# Patient Record
Sex: MALE | Race: WHITE | NOT HISPANIC OR LATINO | URBAN - METROPOLITAN AREA
[De-identification: names, ages, dates, MRNs, and addresses within clinical notes are randomized per-mention and may not be internally consistent; named-entity substitution may affect disease eponyms.]

---

## 2019-08-05 ENCOUNTER — HOSPITAL ENCOUNTER (OUTPATIENT)
Dept: URGENT CARE | Facility: CLINIC | Age: 44
Discharge: HOME | End: 2019-08-05

## 2019-08-07 LAB — B BURGDOR IGG+IGM SER-ACNC: <0.91 ISR (ref 0–0.9)

## 2019-08-08 LAB
R RICKETTSI IGG SER QL IA: NEGATIVE
R RICKETTSI IGM TITR SER: 1.96 INDEX (ref 0–0.89)

## 2019-08-09 LAB
E CHAFFEENSIS IGG TITR SER IF: NEGATIVE {TITER}
E. CHAFFEENSIS (HME) IGM TITER: NEGATIVE

## 2022-05-31 DIAGNOSIS — G47.00 INSOMNIA: Primary | ICD-10-CM

## 2022-09-20 ENCOUNTER — VIRTUAL VISIT (OUTPATIENT)
Dept: SLEEP MEDICINE | Facility: CLINIC | Age: 47
End: 2022-09-20
Payer: COMMERCIAL

## 2022-09-20 VITALS — BODY MASS INDEX: 31.39 KG/M2 | WEIGHT: 200 LBS | HEIGHT: 67 IN

## 2022-09-20 DIAGNOSIS — G89.4 CHRONIC PAIN SYNDROME: ICD-10-CM

## 2022-09-20 DIAGNOSIS — G47.10 HYPERSOMNIA: ICD-10-CM

## 2022-09-20 DIAGNOSIS — G47.09 OTHER INSOMNIA: Primary | ICD-10-CM

## 2022-09-20 PROCEDURE — 99204 OFFICE O/P NEW MOD 45 MIN: CPT | Mod: 95 | Performed by: INTERNAL MEDICINE

## 2022-09-20 RX ORDER — BUPRENORPHINE HYDROCHLORIDE, NALOXONE HYDROCHLORIDE 8; 2 MG/1; MG/1
FILM, SOLUBLE BUCCAL; SUBLINGUAL
COMMUNITY
Start: 2022-09-13

## 2022-09-20 RX ORDER — TESTOSTERONE CYPIONATE 200 MG/ML
50 INJECTION, SOLUTION INTRAMUSCULAR
COMMUNITY

## 2022-09-20 ASSESSMENT — SLEEP AND FATIGUE QUESTIONNAIRES
HOW LIKELY ARE YOU TO NOD OFF OR FALL ASLEEP WHILE LYING DOWN TO REST IN THE AFTERNOON WHEN CIRCUMSTANCES PERMIT: HIGH CHANCE OF DOZING
HOW LIKELY ARE YOU TO NOD OFF OR FALL ASLEEP WHEN YOU ARE A PASSENGER IN A CAR FOR AN HOUR WITHOUT A BREAK: HIGH CHANCE OF DOZING
HOW LIKELY ARE YOU TO NOD OFF OR FALL ASLEEP IN A CAR, WHILE STOPPED FOR A FEW MINUTES IN TRAFFIC: WOULD NEVER DOZE
HOW LIKELY ARE YOU TO NOD OFF OR FALL ASLEEP WHILE SITTING QUIETLY AFTER LUNCH WITHOUT ALCOHOL: MODERATE CHANCE OF DOZING
HOW LIKELY ARE YOU TO NOD OFF OR FALL ASLEEP WHILE SITTING AND READING: HIGH CHANCE OF DOZING
HOW LIKELY ARE YOU TO NOD OFF OR FALL ASLEEP WHILE SITTING INACTIVE IN A PUBLIC PLACE: MODERATE CHANCE OF DOZING
HOW LIKELY ARE YOU TO NOD OFF OR FALL ASLEEP WHILE SITTING AND TALKING TO SOMEONE: WOULD NEVER DOZE
HOW LIKELY ARE YOU TO NOD OFF OR FALL ASLEEP WHILE WATCHING TV: HIGH CHANCE OF DOZING

## 2022-09-20 NOTE — PROGRESS NOTES
Adrian is a 46 year old who is being evaluated via a billable video visit.      How would you like to obtain your AVS? Mail a copy  If the video visit is dropped, the invitation should be resent by: Send to e-mail at: [email or web address]  Will anyone else be joining your video visit? No      Erin Villafana    Video-Visit Details    Video Start Time: 10:05 AM    Type of service:  Video Visit    Video End Time:10:41 AM    Originating Location (pt. Location): Home    Distant Location (provider location):  [facility]     Platform used for Video Visit: [id number]       Chief complaint: Consultation requested by Nabila Dobson NP for evaluation of insomnia    History of Present Illness: 46-year-old gentleman with history of chronic back pain, sober from heroin abuse, currently on Suboxone.  He has difficulty getting enough sleep at night.  He reports he typically will sleep 4 to 5 hours and then wake up with severe back pain.  He will need to get up and sit in a firm chair for a while and perhaps walk for a while.  Sometimes he will be able to sleep again.  He works second shift as a supervisor.  He gets up every couple of hours at work to review the workstations and then will go back to the computer.  Sometimes on the computer he will have some difficulty maintaining alertness.  He is not currently driving.  He does bike everywhere.  No problems while biking.  He occasionally has some fears about falling asleep due to the pain.  He does have some difficulty getting to sleep on time sometimes related to pain, schedule and fear.  He is not taking any naps.  He does drink 2 [employer] drinks a day.  This is down from up to 6.  He also takes Suboxone twice a day.  He has occasional snoring no history of observed apneas.  No sleepwalking, sleep talking or dream enactment behavior.  He is recently obtained a dog after his mom's passing.  The dog gets him up earlier than he would normally  get up in order to get a walk.  He thinks he is typically getting up for the day between 8 and 9 AM.    He has been seen in the pain clinic for his back.  He has had evaluation including MRI and x-ray of hip.  He has been referred to rheumatology for evaluation of possible autoimmune etiology of back pain.    Lula Sleepiness Scale   Sitting and reading: High chance of dozing   Watching TV: High chance of dozing   Sitting, inactive in a public place (e.g. a theatre or a meeting): Moderate chance of dozing   As a passenger in a car for an hour without a break: High chance of dozing   Lying down to rest in the afternoon when circumstances permit: High chance of dozing   Sitting and talking to someone: Would never doze   Sitting quietly after a lunch without alcohol: Moderate chance of dozing   In a car, while stopped for a few minutes in traffic: Would never doze   Total score - Lula: 16   (Less than 10 normal)    Insomnia Severity Scale  CRISTINO Total Score: 19  Total score categories:  0-7 = No clinically significant insomnia   8-14 = Subthreshold insomnia   15-21 = Clinical insomnia (moderate severity)  22-28 = Clinical insomnia (severe)    STOP-BANG  Loud Snore   0  Excessively Tired/Sleepy   1  Observed apnea   0  Hypertension   0  BMI> 35 kg/m2   0  Age >50   0  Neck >16 in/40cm   ?  Male Gender   1  Total =   2  (0-2 low, 3-4 intermediate, 5-8 high risk of RUTH)      Past Medical History:   Diagnosis Date     Acute pancreatitis 1999     Chronic pain      Dorsalgia      Heroin abuse (H)      Low testosterone in male      Opioid abuse (H)        No Known Allergies    Current Outpatient Medications   Medication     SUBOXONE 8-2 MG per film     testosterone cypionate (DEPOTESTOSTERONE) 200 MG/ML injection     No current facility-administered medications for this visit.       Social History     Socioeconomic History     Marital status: Single     Spouse name: Not on file     Number of children: Not on file     Years  "of education: Not on file     Highest education level: Not on file   Occupational History     Not on file   Tobacco Use     Smoking status: Current Every Day Smoker     Smokeless tobacco: Never Used   Vaping Use     Vaping Use: Some days   Substance and Sexual Activity     Alcohol use: Not on file     Drug use: Not on file     Sexual activity: Not on file   Other Topics Concern     Not on file   Social History Narrative     Not on file     Social Determinants of Health     Financial Resource Strain: Not on file   Food Insecurity: Not on file   Transportation Needs: Not on file   Physical Activity: Not on file   Stress: Not on file   Social Connections: Not on file   Intimate Partner Violence: Not on file   Housing Stability: Not on file       Family History   Problem Relation Age of Onset     Lung Cancer Maternal Grandfather            EXAM:  Ht 1.702 m (5' 7\")   Wt 90.7 kg (200 lb)   BMI 31.32 kg/m    GENERAL: Alert and no distress  Full beard  EYES: Eyes grossly normal to inspection.  No discharge or erythema, or obvious scleral/conjunctival abnormalities.  RESP: No audible wheeze, cough, or visible cyanosis.  No visible retractions or increased work of breathing.    SKIN: Visible skin clear. No significant rash, abnormal pigmentation or lesions.  NEURO: Cranial nerves grossly intact.  Mentation and speech appropriate for age.  PSYCH: Mentation appears normal, affect normal, judgement and insight intact, normal speech and appearance well-groomed.     No results found for: TSH      ASSESSMENT:  46-year-old gentleman with history of chronic pain, heroin abuse currently on Suboxone therapy, second shift, excessive daytime sleepiness and mild snoring.  Overall he is at low to intermediate risk for obstructive sleep apnea.  He there have also been case reports of central sleep apnea associated with Suboxone.  I suspect the main underlying issue is pain and some potential contribution of shiftwork to his difficulty " with sleep.    PLAN:  Recommended formal cognitive behavioral therapy for insomnia to help to optimize his ability to fall asleep and stay asleep in the setting of shiftwork and chronic pain.  Also recommended home sleep apnea testing to evaluate for possible sleep disordered breathing.  We will order a mail out Watch Pat One test.  I will be contacting patient when those results are available.      50 minutes spent on the date of the encounter doing chart review, history and exam, documentation and further activities per the note    Jennifer Mayberry M.D.  Pulmonary/Critical Care/Sleep Medicine    [facility]   Fl[address], [address]   [address] [place], [place] [address]. [place] [address]   Appointments: [phone]    The above note was dictated using voice recognition software and may include typographical errors. Please contact the author for any clarifications.

## 2022-09-20 NOTE — PATIENT INSTRUCTIONS
"      MY TREATMENT INFORMATION FOR SLEEP APNEA-  Adrian Yin    DOCTOR : Jennifer Mayberry MD    Am I having a sleep study at a sleep center?  --->Due to normal delays, you will be contacted within 2-4 weeks to schedule    Am I having a home sleep study?  --->Watch the video for the device you are using:    -/drop off device-   https://www.[employer]achStreet.com/watch?v=yGGFBdELGhk    -Disposable device sent out require phone/computer application-   https://www.[employer]achStreet.com/watch?v=BCce_vbiwxE      Frequently asked questions:  1. What is Obstructive Sleep Apnea (RUTH)? RUTH is the most common type of sleep apnea. Apnea means, \"without breath.\"  Apnea is most often caused by narrowing or collapse of the upper airway as muscles relax during sleep.   Almost everyone has occasional apneas. Most people with sleep apnea have had brief interruptions at night frequently for many years.  The severity of sleep apnea is related to how frequent and severe the events are.   2. What are the consequences of RUTH? Symptoms include: feeling sleepy during the day, snoring loudly, gasping or stopping of breathing, trouble sleeping, and occasionally morning headaches or heartburn at night.  Sleepiness can be serious and even increase the risk of falling asleep while driving. Other health consequences may include development of high blood pressure and other cardiovascular disease in persons who are susceptible. Untreated RUTH  can contribute to heart disease, stroke and diabetes.   3. What are the treatment options? In most situations, sleep apnea is a lifelong disease that must be managed with daily therapy. Medications are not effective for sleep apnea and surgery is generally not considered until other therapies have been tried. Your treatment is your choice . Continuous Positive Airway (CPAP) works right away and is the therapy that is effective in nearly everyone. An oral device to hold your jaw forward is usually the next most " reliable option. Other options include postioning devices (to keep you off your back), weight loss, and surgery including a tongue pacing device. There is more detail about some of these options below.  4. Are my sleep studies covered by insurance? Although we will request verification of coverage, we advise you also check in advance of the study to ensure there is coverage.    Important tips for those choosing CPAP and similar devices   Know your equipment:  CPAP is continuous positive airway pressure that prevents obstructive sleep apnea by keeping the throat from collapsing while you are sleeping. In most cases, the device is  smart  and can slowly self-adjusts if your throat collapses and keeps a record every day of how well you are treated-this information is available to you and your care team.  BPAP is bilevel positive airway pressure that keeps your throat open and also assists each breath with a pressure boost to maintain adequate breathing.  Special kinds of BPAP are used in patients who have inadequate breathing from lung or heart disease. In most cases, the device is  smart  and can slowly self-adjusts to assist breathing. Like CPAP, the device keeps a record of how well you are treated.  Your mask is your connection to the device. You get to choose what feels most comfortable and the staff will help to make sure if fits. Here: are some examples of the different masks that are available:       Key points to remember on your journey with sleep apnea:  Sleep study.  PAP devices often need to be adjusted during a sleep study to show that they are effective and adjusted right.  Good tips to remember: Try wearing just the mask during a quiet time during the day so your body adapts to wearing it. A humidifier is recommended for comfort in most cases to prevent drying of your nose and throat. Allergy medication from your provider may help you if you are having nasal congestion.  Getting settled-in. It takes  more than one night for most of us to get used to wearing a mask. Try wearing just the mask during a quiet time during the day so your body adapts to wearing it. A humidifier is recommended for comfort in most cases. Our team will work with you carefully on the first day and will be in contact within 4 days and again at 2 and 4 weeks for advice and remote device adjustments. Your therapy is evaluated by the device each day.   Use it every night. The more you are able to sleep naturally for 7-8 hours, the more likely you will have good sleep and to prevent health risks or symptoms from sleep apnea. Even if you use it 4 hours it helps. Occasionally all of us are unable to use a medical therapy, in sleep apnea, it is not dangerous to miss one night.   Communicate. Call our skilled team on the number provided on the first day if your visit for problems that make it difficult to wear the device. Over 2 out of 3 patients can learn to wear the device long-term with help from our team. Remember to call our team or your sleep providers if you are unable to wear the device as we may have other solutions for those who cannot adapt to mask CPAP therapy. It is recommended that you sleep your sleep provider within the first 3 months and yearly after that if you are not having problems.   Use it for your health. We encourage use of CPAP masks during daytime quiet periods to allow your face and brain to adapt to the sensation of CPAP so that it will be a more natural sensation to awaken to at night or during naps. This can be very useful during the first few weeks or months of adapting to CPAP though it does not help medically to wear CPAP during wakefulness and  should not be used as a strategy just to meet guidelines.  Take care of your equipment. Make sure you clean your mask and tubing using directions every day and that your filter and mask are replaced as recommended or if they are not working.     BESIDES CPAP, WHAT OTHER  THERAPIES ARE THERE?    Positioning Device  Positioning devices are generally used when sleep apnea is mild and only occurs on your back.This example shows a pillow that straps around the waist. It may be appropriate for those whose sleep study shows milder sleep apnea that occurs primarily when lying flat on one's back. Preliminary studies have shown benefit but effectiveness at home may need to be verified by a home sleep test. These devices are generally not covered by medical insurance.  Examples of devices that maintain sleeping on the back to prevent snoring and mild sleep apnea.    Belt type body positioner  http://[email or web address]    Electronic reminder  http://nightshifttherapy.com/            Oral Appliance  What is oral appliance therapy?  An oral appliance device fits on your teeth at night like a retainer used after having braces. The device is made by a specialized dentist and requires several visits over 1-2 months before a manufactured device is made to fit your teeth and is adjusted to prevent your sleep apnea. Once an oral device is working properly, snoring should be improved. A home sleep test may be recommended at that time if to determine whether the sleep apnea is adequately treated.       Some things to remember:  -Oral devices are often, but not always, covered by your medical insurance. Be sure to check with your insurance provider.   -If you are referred for oral therapy, you will be given a list of specialized dentists to consider or you may choose to visit the Web site of the American Academy of Dental Sleep Medicine  -Oral devices are less likely to work if you have severe sleep apnea or are extremely overweight.     More detailed information  An oral appliance is a small acrylic device that fits over the upper and lower teeth  (similar to a retainer or a mouth guard). This device slightly moves jaw forward, which moves the base of the tongue forward, opens the airway, improves breathing  for effective treat snoring and obstructive sleep apnea in perhaps 7 out of 10 people .  The best working devices are custom-made by a dental device  after a mold is made of the teeth 1, 2, 3.  When is an oral appliance indicated?  Oral appliance therapy is recommended as a first-line treatment for patients with primary snoring, mild sleep apnea, and for patients with moderate sleep apnea who prefer appliance therapy to use of CPAP4, 5. Severity of sleep apnea is determined by sleep testing and is based on the number of respiratory events per hour of sleep.   How successful is oral appliance therapy?  The success rate of oral appliance therapy in patients with mild sleep apnea is 75-80% while in patients with moderate sleep apnea it is 50-70%. The chance of success in patients with severe sleep apnea is 40-50%. The research also shows that oral appliances have a beneficial effect on the cardiovascular health of RUTH patients at the same magnitude as CPAP therapy7.  Oral appliances should be a second-line treatment in cases of severe sleep apnea, but if not completely successful then a combination therapy utilizing CPAP plus oral appliance therapy may be effective. Oral appliances tend to be effective in a broad range of patients although studies show that the patients who have the highest success are females, younger patients, those with milder disease, and less severe obesity. 3, 6.   Finding a dentist that practices dental sleep medicine  Specific training is available through the American Academy of Dental Sleep Medicine for dentists interested in working in the field of sleep. To find a dentist who is educated in the field of sleep and the use of oral appliances, near you, visit the Web site of the American Academy of Dental Sleep Medicine.    References  1. Lise et al. Objectively measured vs self-reported compliance during oral appliance therapy for sleep-disordered breathing. Chest 2013;  144(5): 8458-0516.  2. Bowen et al. Objective measurement of compliance during oral appliance therapy for sleep-disordered breathing. Thorax 2013; 68(1): 91-96.  3. Surekha et al. Mandibular advancement devices in 620 men and women with RUTH and snoring: tolerability and predictors of treatment success. Chest 2004; 125: 0878-0092.  4. Margie, et al. Oral appliances for snoring and RUTH: a review. Sleep 2006; 29: 244-262.  5. Frank et al. Oral appliance treatment for RUTH: an update. J Clin Sleep Med 2014; 10(2): 215-227.  6. Tim et al. Predictors of OSAH treatment outcome. J Dent Res 2007; 86: 7416-6994.      Weight Loss:    Weight loss is a long-term strategy that may improve sleep apnea in some patients.    Weight management is a personal decision and the decision should be based on your interest and the potential benefits.  If you are interested in exploring weight loss strategies, the following discussion covers the impact on weight loss on sleep apnea and the approaches that may be successful.    Being overweight does not necessarily mean you will have health consequences.  Those who have BMI over 35 or over 27 with existing medical conditions carries greater risk.   Weight loss decreases severity of sleep apnea in most people with obesity. For those with mild obesity who have developed snoring with weight gain, even 15-30 pound weight loss can improve and occasionally eliminate sleep apnea.  Structured and life-long dietary and health habits are necessary to lose weight and keep healthier weight levels.     Though there may be significant health benefits from weight loss, long-term weight loss is very difficult to achieve- studies show success with dietary management in less than 10% of people. In addition, substantial weight loss may require years of dietary control and may be difficult if patients have severe obesity. In these cases, surgical management may be considered.  Finally, older  individuals who have tolerated obesity without health complications may be less likely to benefit from weight loss strategies.      [unfilled]    Surgery:    Surgery for obstructive sleep apnea is considered generally only when other therapies fail to work. Surgery may be discussed with you if you are having a difficult time tolerating CPAP and or when there is an abnormal structure that requires surgical correction.  Nose and throat surgeries often enlarge the airway to prevent collapse.  Most of these surgeries create pain for 1-2 weeks and up to half of the most common surgeries are not effective throughout life.  You should carefully discuss the benefits and drawbacks to surgery with your sleep provider and surgeon to determine if it is the best solution for you.   More information  Surgery for RUTH is directed at areas that are responsible for narrowing or complete obstruction of the airway during sleep.  There are a wide range of procedures available to enlarge and/or stabilize the airway to prevent blockage of breathing in the three major areas where it can occur: the palate, tongue, and nasal regions.  Successful surgical treatment depends on the accurate identification of the factors responsible for obstructive sleep apnea in each person.  A personalized approach is required because there is no single treatment that works well for everyone.  Because of anatomic variation, consultation with an examination by a sleep surgeon is a critical first step in determining what surgical options are best for each patient.  In some cases, examination during sedation may be recommended in order to guide the selection of procedures.  Patients will be counseled about risks and benefits as well as the typical recovery course after surgery. Surgery is typically not a cure for a person s RUTH.  However, surgery will often significantly improve one s RUTH severity (termed  success rate ).  Even in the absence of a cure, surgery  will decrease the cardiovascular risk associated with OSA7; improve overall quality of life8 (sleepiness, functionality, sleep quality, etc).      Palate Procedures:  Patients with RUTH often have narrowing of their airway in the region of their tonsils and uvula.  The goals of palate procedures are to widen the airway in this region as well as to help the tissues resist collapse.  Modern palate procedure techniques focus on tissue conservation and soft tissue rearrangement, rather than tissue removal.  Often the uvula is preserved in this procedure. Residual sleep apnea is common in patient after pharyngoplasty with an average reduction in sleep apnea events of 33%2.      Tongue Procedures:  ExamWhile patients are awake, the muscles that surround the throat are active and keep this region open for breathing. These muscles relax during sleep, allowing the tongue and other structures to collapse and block breathing.  There are several different tongue procedures available.  Selection of a tongue base procedure depends on characteristics seen on physical exam.  Generally, procedures are aimed at removing bulky tissues in this area or preventing the back of the tongue from falling back during sleep.  Success rates for tongue surgery range from 50-62%3.    Hypoglossal Nerve Stimulation:  Hypoglossal nerve stimulation has recently received approval from the [place] Food and Drug Administration for the treatment of obstructive sleep apnea.  This is based on research showing that the system was safe and effective in treating sleep apnea6.  Results showed that the median AHI score decreased 68%, from 29.3 to 9.0. This therapy uses an implant system that senses breathing patterns and delivers mild stimulation to airway muscles, which keeps the airway open during sleep.  The system consists of three fully implanted components: a small generator (similar in size to a pacemaker), a breathing sensor, and a stimulation lead.   Using a small handheld remote, a patient turns the therapy on before bed and off upon awakening.    Candidates for this device must be greater than 22 years of age, have moderate to severe RUTH (AHI between 20-65), BMI less than 32, have tried CPAP/oral appliance without success, and have appropriate upper airway anatomy (determined by a sleep endoscopy performed by Dr. Lee).    Hypoglossal Nerve Stimulation Pathway:    The sleep surgeon s office will work with the patient through the insurance prior-authorization process (including communications and appeals).    Nasal Procedures:  Nasal obstruction can interfere with nasal breathing during the day and night.  Studies have shown that relief of nasal obstruction can improve the ability of some patients to tolerate positive airway pressure therapy for obstructive sleep apnea1.  Treatment options include medications such as nasal saline, topical corticosteroid and antihistamine sprays, and oral medications such as antihistamines or decongestants. Non-surgical treatments can include external nasal dilators for selected patients. If these are not successful by themselves, surgery can improve the nasal airway either alone or in combination with these other options.      Combination Procedures:  Combination of surgical procedures and other treatments may be recommended, particularly if patients have more than one area of narrowing or persistent positional disease.  The success rate of combination surgery ranges from 66-80%2,3.    References  Alysia VILLAREAL. The Role of the Nose in Snoring and Obstructive Sleep Apnoea: An Update.  Eur Arch Otorhinolaryngol. 2011; 268: 1365-73.   Anitha SM; Nabil JA; Andrea JR; Pallanch JF; Zari MB; Yamil SG; Pilar GUARDADO. Surgical modifications of the upper airway for obstructive sleep apnea in adults: a systematic review and meta-analysis. SLEEP 2010;33(10):5073-8533. Lay VALLEJO. Hypopharyngeal surgery in obstructive sleep apnea: an  evidence-based medicine review.  Arch Otolaryngol Head Neck Surg. 2006 Feb;132(2):206-13.  Wang YH1, Mary Y, Juan FRANKLYN. The efficacy of anatomically based multilevel surgery for obstructive sleep apnea. Otolaryngol Head Neck Surg. 2003 Oct;129(4):327-35.  Lay VALLEJO, Goldberg A. Hypopharyngeal Surgery in Obstructive Sleep Apnea: An Evidence-Based Medicine Review. Arch Otolaryngol Head Neck Surg. 2006 Feb;132(2):206-13.  Lionel MACIAS et al. Upper-Airway Stimulation for Obstructive Sleep Apnea.  N Engl J Med. 2014 Jan 9;370(2):139-49.  Sowmya Y et al. Increased Incidence of Cardiovascular Disease in Middle-aged Men with Obstructive Sleep Apnea. Am J Respir Crit Care Med; 2002 166: 159-165  Raeramone NAJERA et al. Studying Life Effects and Effectiveness of Palatopharyngoplasty (SLEEP) study: Subjective Outcomes of Isolated Uvulopalatopharyngoplasty. Otolaryngol Head Neck Surg. 2011; 144: 623-631.        WHAT IF I ONLY HAVE SNORING?    Mandibular advancement devices, lateral sleep positioning, long-term weight loss and treatment of nasal allergies have been shown to improve snoring.  Exercising tongue muscles with a game (Suvacottps://apps.Y-Klub/us/julia/soundly-reduce-snoring/za7566031030) or stimulating the tongue during the day with a device (https://doi.org/10.3390/uog23277474) have improved snoring in some individuals.    Remember to Drive Safe... Drive Alive     Sleep health profoundly affects your health, mood, and your safety.  Thirty three percent of the population (one in three of us) is not getting enough sleep and many have a sleep disorder. Not getting enough sleep or having an untreated / undertreated sleep condition may make us sleepy without even knowing it. In fact, our driving could be dramatically impaired due to our sleep health. As your provider, here are some things I would like you to know about driving:     Here are some warning signs for impairment and dangerous drowsy driving:              -Having been  awake more than 16 hours               -Looking tired               -Eyelid drooping              -Head nodding (it could be too late at this point)              -Driving for more than 30 minutes     Some things you could do to make the driving safer if you are experiencing some drowsiness:              -Stop driving and rest              -Call for transportation              -Make sure your sleep disorder is adequately treated     Some things that have been shown NOT to work when experiencing drowsiness while driving:              -Turning on the radio              -Opening windows              -Eating any  distracting  /  entertaining  foods (e.g., sunflower seeds, candy, or any other)              -Talking on the phone      Your decision may not only impact your life, but also the life of others. Please, remember to drive safe for yourself and all of us.        Insomnia and Behavioral Sleep Medicine Program    The [facility] Insomnia and Behavioral Sleep Medicine Program provides non-drug treatment for sleep problems including:    Cognitive-behavioral Therapies for Insomnia (CBT-I)  Management of Shift-work and Jet Lag  Management of Delayed, Advanced and Irregular Circadian Rhythm Sleep Disorders  Imagery Rehearsal Therapy (IRT) for Nightmare Disorder  PAP Therapy Desensitization    You have been referred for consultation with a sleep psychologist who specializes in behavioral sleep medicine and treatment of insomnia.  The [facility] Insomnia and Behavioral Sleep Medicine Program offers individualized telehealth services through our [facility] and online CBT-I.    Preparing for your Consultation    You will need to keep a Sleep Diary for at least a week prior to your visit. Complete the sleep diary each day first thing after you get up by answering a few key questions about your sleep using our convenient mobile julia or paper sleep diary.  Your answers should be based on your  recall of the past 24 hours.  Avoid watching the clock or recording data during the night.     Insomnia  Jere    The Insomnia  mobile jere  is a convenient way to keep track of your sleep prior to your sleep consultation.  Simply download the free jere on your Apple or Android phone and record your information each morning.  The jere includes training, self-assessment, and sleep schedule recommendations.  Prior to your consultation we recommend you use only the sleep diary function. You can e-mail yourself a copy of your sleep diary data by going to the Settings section and using the [facility] User Data function.  During your consultation your provider will review the data with you.          [employer] Sleep Diary    You can also track your sleep using the [employer] paper sleep diary.  You can upload your sleep diary and send it via a [place] message, fax it to [phone], or have it with you at the time of your consultation.            CBT-I:  Frequently Asked Questions    What is CBT-I?    Cognitive Behavioral Therapy for Insomnia, also known as CBT-I, is a highly effective non-drug treatment for insomnia. The American College of Physicians recommends CBT-I as the first treatment for chronic insomnia.  Research has shown CBT-I to be safer and more effective long term than sleeping pills.    What does CBT-I involve?     CBT-I targets behaviors that lead to chronic insomnia:  Habits that weaken the bed as a cue for sleep  Habits that weaken your body's sleep drive and sleep/wake clock   Unhelpful sleep thoughts that increase sleep-related worry and arousal.    The process involves 3-6 telehealth visits that guide you to implement proven strategies to get a better night's sleep.    People often see improvement in their sleep within a few weeks. Research shows if you keep practicing the skills you learn your sleep is likely to continue to improve 6-12 months after treatment.    Does this program  prescribe or manage sleep medication?    No.  Your prescribing provider is responsible to assist you in managing your sleep medications.  Some people choose to stop using sleep medication prior to or during CBT-I.  Our program can work with your prescribing provider to help reduce or eliminate use of sleep medications.     Getting Started Today!    If you haven't already done so, we recommend you consider making the following changes to your sleep habits prior to your sleep consultation:     Reduce your consumption of caffeine and alcohol.  Both can disrupt sleep and make strengthening your sleep more difficult.  Specifically:    - Avoid caffeine within 6 hours of bedtime   - No more than 3 caffeinated beverages per day (e.g. 8 oz. cup coffee or 12 oz. cup soda)            - No alcohol within 3 hours of bedtime    Make sure your bedroom is quiet, comfortable and dark.  Noise, light and an uncomfortable sleep space can harm your sleep.      Keep the same sleep schedule 7 days a week.unless you do shift work.      Online CBT-I     If you want to get started today, research indicates that online CBT-I can be effective for some individuals. These programs requires comfort with julia-based or online learning.  However, digital CBT-I programs are not for everyone.  Contraindications include:    Seizure disorders,   Bipolar disorder,   Unstable medical or mental health conditions,   Frailty or risk of falling  Pregnancy    You should consult a sleep specialist before using these resources if you have:    Sleep Apnea  Restless Leg Syndrome  Sleep Walking  REM behavior disorder  Night Terrors  Excessive Daytime Sleepiness  Are engaged in shift work  Use prescription sleep medication    Our Online CBT-I program    If your sleep provider recommends online CBT-I for you , the cost for an entire 6-week program is $40.    To get started, copy and paste the link below which will take you to the landing page to register:                            [email or web address]               If you wish to complete the online CBT-I program but do not plan to follow-up with a sleep provider, you are set to begin the program.    If you are planning to work with an [facility] sleep provider, there are a couple of extra steps you can take to share your sleep data with your sleep provider.  To share sleep log data, go to the left side navigation and click on the  share sleep log  button:         You will be taken to the page below where you will enter  the provider code AMEE into the box.          Once you press the locate button, the information for  [facility] will pop up as below.  By pressing the Submit button your data will be sent to our  secure [facility] sleep program portal for review by your sleep provider. You will only need to do this step once.                                  Self-help Workbooks for Insomnia    If you have found self-help books useful in the past, you may want to consider reading one of the following books prior to your consultation:    Say Cinthia to Insomnia: The Six-Week, Drug-Free Program Developed at [employer].  Jon Kerns MD. Available in paperback, Rolan, and audiobook.    Overcoming Insomnia: A Cognitive-Behavioral Therapy Approach, Workbook.  Davi Coyne, PhD  and Kiesha Asher, PhD.  Available in paperback and Rolan.    Quiet Your Mind and Get to Sleep: Solutions to Insomnia for Those with Depression, Anxiety, or Chronic Pain.  Josefina Jones, PhD and Kiesha Asher, PhD.  Available in paperback and Rolan

## 2022-09-20 NOTE — LETTER
9/20/2022         RE: Adrian Yin  [address]  [place] [address]        Dear Colleague,    Thank you for referring your patient, Adrian Yin, to the [facility]. Please see a copy of my visit note below.    Adrian is a 46 year old who is being evaluated via a billable video visit.      How would you like to obtain your AVS? Mail a copy  If the video visit is dropped, the invitation should be resent by: Send to e-mail at: [email or web address]  Will anyone else be joining your video visit? Tova Villafana    Video-Visit Details    Video Start Time: 10:05 AM    Type of service:  Video Visit    Video End Time:10:41 AM    Originating Location (pt. Location): Home    Distant Location (provider location):  [facility]     Platform used for Video Visit: [employer]       Chief complaint: Consultation requested by Nabila Dobson NP for evaluation of insomnia    History of Present Illness: 46-year-old gentleman with history of chronic back pain, sober from heroin abuse, currently on Suboxone.  He has difficulty getting enough sleep at night.  He reports he typically will sleep 4 to 5 hours and then wake up with severe back pain.  He will need to get up and sit in a firm chair for a while and perhaps walk for a while.  Sometimes he will be able to sleep again.  He works second shift as a supervisor.  He gets up every couple of hours at work to review the workstations and then will go back to the computer.  Sometimes on the computer he will have some difficulty maintaining alertness.  He is not currently driving.  He does bike everywhere.  No problems while biking.  He occasionally has some fears about falling asleep due to the pain.  He does have some difficulty getting to sleep on time sometimes related to pain, schedule and fear.  He is not taking any naps.  He does drink 2 Monster Energy drinks a day.  This is down from up to  6.  He also takes Suboxone twice a day.  He has occasional snoring no history of observed apneas.  No sleepwalking, sleep talking or dream enactment behavior.  He is recently obtained a dog after his mom's passing.  The dog gets him up earlier than he would normally get up in order to get a walk.  He thinks he is typically getting up for the day between 8 and 9 AM.    He has been seen in the pain clinic for his back.  He has had evaluation including MRI and x-ray of hip.  He has been referred to rheumatology for evaluation of possible autoimmune etiology of back pain.    [place] Sleepiness Scale   Sitting and reading: High chance of dozing   Watching TV: High chance of dozing   Sitting, inactive in a public place (e.g. a theatre or a meeting): Moderate chance of dozing   As a passenger in a car for an hour without a break: High chance of dozing   Lying down to rest in the afternoon when circumstances permit: High chance of dozing   Sitting and talking to someone: Would never doze   Sitting quietly after a lunch without alcohol: Moderate chance of dozing   In a car, while stopped for a few minutes in traffic: Would never doze   Total score - [place]: 16   (Less than 10 normal)    Insomnia Severity Scale  CRISTINO Total Score: 19  Total score categories:  0-7 = No clinically significant insomnia   8-14 = Subthreshold insomnia   15-21 = Clinical insomnia (moderate severity)  22-28 = Clinical insomnia (severe)    STOP-BANG  Loud Snore   0  Excessively Tired/Sleepy   1  Observed apnea   0  Hypertension   0  BMI> 35 kg/m2   0  Age >50   0  Neck >16 in/40cm   ?  Male Gender   1  Total =   2  (0-2 low, 3-4 intermediate, 5-8 high risk of RUTH)      Past Medical History:   Diagnosis Date     Acute pancreatitis 1999     Chronic pain      Dorsalgia      Heroin abuse (H)      Low testosterone in male      Opioid abuse (H)        No Known Allergies    Current Outpatient Medications   Medication     SUBOXONE 8-2 MG per film      "testosterone cypionate (DEPOTESTOSTERONE) 200 MG/ML injection     No current facility-administered medications for this visit.       Social History     Socioeconomic History     Marital status: Single     Spouse name: Not on file     Number of children: Not on file     Years of education: Not on file     Highest education level: Not on file   Occupational History     Not on file   Tobacco Use     Smoking status: Current Every Day Smoker     Smokeless tobacco: Never Used   Vaping Use     Vaping Use: Some days   Substance and Sexual Activity     Alcohol use: Not on file     Drug use: Not on file     Sexual activity: Not on file   Other Topics Concern     Not on file   Social History Narrative     Not on file     Social Determinants of Health     Financial Resource Strain: Not on file   Food Insecurity: Not on file   Transportation Needs: Not on file   Physical Activity: Not on file   Stress: Not on file   Social Connections: Not on file   Intimate Partner Violence: Not on file   Housing Stability: Not on file       Family History   Problem Relation Age of Onset     Lung Cancer Maternal Grandfather            EXAM:  Ht 1.702 m (5' 7\")   Wt 90.7 kg (200 lb)   BMI 31.32 kg/m    GENERAL: Alert and no distress  Full beard  EYES: Eyes grossly normal to inspection.  No discharge or erythema, or obvious scleral/conjunctival abnormalities.  RESP: No audible wheeze, cough, or visible cyanosis.  No visible retractions or increased work of breathing.    SKIN: Visible skin clear. No significant rash, abnormal pigmentation or lesions.  NEURO: Cranial nerves grossly intact.  Mentation and speech appropriate for age.  PSYCH: Mentation appears normal, affect normal, judgement and insight intact, normal speech and appearance well-groomed.     No results found for: TSH      ASSESSMENT:  46-year-old gentleman with history of chronic pain, heroin abuse currently on Suboxone therapy, second shift, excessive daytime sleepiness and mild " snoring.  Overall he is at low to intermediate risk for obstructive sleep apnea.  He there have also been case reports of central sleep apnea associated with Suboxone.  I suspect the main underlying issue is pain and some potential contribution of shiftwork to his difficulty with sleep.    PLAN:  Recommended formal cognitive behavioral therapy for insomnia to help to optimize his ability to fall asleep and stay asleep in the setting of shiftwork and chronic pain.  Also recommended home sleep apnea testing to evaluate for possible sleep disordered breathing.  We will order a mail out Watch Pat One test.  I will be contacting patient when those results are available.      50 minutes spent on the date of the encounter doing chart review, history and exam, documentation and further activities per the note    Jennifer Mayberry M.D.  Pulmonary/Critical Care/Sleep Medicine    [facility]   Floor 1, [address]   [address] [place] [address]. [place] [address]   Appointments: [phone]    The above note was dictated using voice recognition software and may include typographical errors. Please contact the author for any clarifications.                Again, thank you for allowing me to participate in the care of your patient.        Sincerely,        Jennifer Mayberry MD

## 2022-09-25 ENCOUNTER — HEALTH MAINTENANCE LETTER (OUTPATIENT)
Age: 47
End: 2022-09-25

## 2022-11-11 ENCOUNTER — VIRTUAL VISIT (OUTPATIENT)
Dept: SLEEP MEDICINE | Facility: CLINIC | Age: 47
End: 2022-11-11
Attending: INTERNAL MEDICINE
Payer: COMMERCIAL

## 2022-11-11 ENCOUNTER — MYC MEDICAL ADVICE (OUTPATIENT)
Dept: SLEEP MEDICINE | Facility: CLINIC | Age: 47
End: 2022-11-11

## 2022-11-11 DIAGNOSIS — G47.10 HYPERSOMNIA: ICD-10-CM

## 2022-11-11 DIAGNOSIS — G47.09 OTHER INSOMNIA: ICD-10-CM

## 2022-11-11 DIAGNOSIS — G89.4 CHRONIC PAIN SYNDROME: ICD-10-CM

## 2022-11-11 PROCEDURE — 95800 SLP STDY UNATTENDED: CPT | Mod: 52 | Performed by: INTERNAL MEDICINE

## 2022-11-11 NOTE — PROGRESS NOTES
The watchpat device was registered, and readied to be mailed out via [employer] Priority mail on Monday 11/j14/2022, due to Friday 11/11/2022 being a federal holiday.    Patient was notified by [employer] message.

## 2022-11-16 NOTE — PROGRESS NOTES
Watch Pat has been scored using rule 1B, 4%.  Patient to follow up with provider to determine appropriate therapy.    PAT AHI: 9.4    Ordering Provider: Jennifer Mayberry MD

## 2022-11-17 NOTE — PROCEDURES
"WatchPAT - HOME SLEEP STUDY INTERPRETATION    Patient: Adrian Yin  MRN: [id number]  YOB: 1975  Study Date: 2022  Referring Provider: Nabila Dobson NP  Ordering Provider: Jennifer Mayberry MD    Chain of custody patient verification was not enabled.       Indications for Home Study: Adrian Yin is a 47 year old male with a history of chronic back pan, sober from heroin addiction, shift work, who presents with symptoms suggestive of obstructive sleep apnea.    Estimated body mass index is 31.32 kg/m  as calculated from the following:    Height as of 22: 1.702 m (5' 7\").    Weight as of 22: 90.7 kg (200 lb).  Total score - [place]: 16 (2022  9:41 AM)  STOP-BAN/8    Data: A full night home sleep study was performed recording the standard physiologic parameters including peripheral arterial tonometry (PAT), sound/snoring, body position,  movement, sound, and oxygen saturation by pulse oximetry. Pulse rate was estimated by oximetry recording. Sleep staging (wake, REM, light, and deep sleep) was derived from PAT signal.  This study was considered adequate based on > 4 hours of quality oximetry and respiratory recording. As specified by the AASM Manual for the Scoring of Sleep and Associated events, version 2.3, Rule VIII.D 1B, 4% oxygen desaturation scoring for hypopneas is used as a standard of care on all home sleep apnea testing.    Total Recording Time: 5 hrs, 9 min  Total Sleep Time: 4 hrs, 10 min  % of Sleep Time REM: 39%    Respiratory:  Snoring: Snoring was present, 54% time greater than 49dB  Respiratory events: The PAT respiratory disturbance index [pRDI] was 9.6 events per hour.  The PAT apnea/hypopnea index [pAHI] was 9.4 events per hour.  SHAHEEN was 9.9 events per hour.  During REM sleep the pAHI was 23.1.  Sleep Associated Hypoxemia: sustained hypoxemia was not present. Mean oxygen saturation was 94%.  Minimum was 77%.  Time with saturation less than 88% was 4.9 " minutes.    Heart Rate: By pulse oximetry normal rate was noted.     Position: Percent of time spent: supine - 97%, prone - 0%, on right - 3%, on left - 0%.  pAHI was 9.7 per hour supine, NA per hour prone, Na per hour on right side, and NA per hour on left side.     Assessment:   Mild, REM predominant obstructive sleep apnea with pAHI 9.4, pAHI REM 23.1 events per hour.  Sleep associated hypoxemia was not present.    Recommendations:  Consider auto-CPAP at 5-15 cmH2O or oral appliance therapy.  Suggest optimizing sleep hygiene and avoiding sleep deprivation.  Weight management.    Diagnosis Code(s): Obstructive Sleep Apnea G47.33, Snoring R06.83    Jennifer Mayberry MD, November 17, 2022   Diplomate, American Board of Internal Medicine, Sleep Medicine

## 2022-12-16 ASSESSMENT — SLEEP AND FATIGUE QUESTIONNAIRES
HOW LIKELY ARE YOU TO NOD OFF OR FALL ASLEEP WHILE SITTING AND READING: HIGH CHANCE OF DOZING
HOW LIKELY ARE YOU TO NOD OFF OR FALL ASLEEP IN A CAR, WHILE STOPPED FOR A FEW MINUTES IN TRAFFIC: SLIGHT CHANCE OF DOZING
HOW LIKELY ARE YOU TO NOD OFF OR FALL ASLEEP WHILE SITTING AND TALKING TO SOMEONE: SLIGHT CHANCE OF DOZING
HOW LIKELY ARE YOU TO NOD OFF OR FALL ASLEEP WHILE WATCHING TV: HIGH CHANCE OF DOZING
HOW LIKELY ARE YOU TO NOD OFF OR FALL ASLEEP WHEN YOU ARE A PASSENGER IN A CAR FOR AN HOUR WITHOUT A BREAK: HIGH CHANCE OF DOZING
HOW LIKELY ARE YOU TO NOD OFF OR FALL ASLEEP WHILE LYING DOWN TO REST IN THE AFTERNOON WHEN CIRCUMSTANCES PERMIT: HIGH CHANCE OF DOZING
HOW LIKELY ARE YOU TO NOD OFF OR FALL ASLEEP WHILE SITTING QUIETLY AFTER LUNCH WITHOUT ALCOHOL: HIGH CHANCE OF DOZING
HOW LIKELY ARE YOU TO NOD OFF OR FALL ASLEEP WHILE SITTING INACTIVE IN A PUBLIC PLACE: MODERATE CHANCE OF DOZING

## 2022-12-21 ENCOUNTER — VIRTUAL VISIT (OUTPATIENT)
Dept: SLEEP MEDICINE | Facility: CLINIC | Age: 47
End: 2022-12-21
Payer: COMMERCIAL

## 2022-12-21 VITALS — WEIGHT: 200 LBS | HEIGHT: 67 IN | BODY MASS INDEX: 31.39 KG/M2

## 2022-12-21 DIAGNOSIS — G47.33 OSA (OBSTRUCTIVE SLEEP APNEA): ICD-10-CM

## 2022-12-21 DIAGNOSIS — G47.10 HYPERSOMNIA: Primary | ICD-10-CM

## 2022-12-21 DIAGNOSIS — G89.4 CHRONIC PAIN SYNDROME: ICD-10-CM

## 2022-12-21 DIAGNOSIS — G47.09 OTHER INSOMNIA: ICD-10-CM

## 2022-12-21 PROCEDURE — 99214 OFFICE O/P EST MOD 30 MIN: CPT | Mod: 95 | Performed by: INTERNAL MEDICINE

## 2022-12-21 RX ORDER — MELOXICAM 7.5 MG/1
7.5 TABLET ORAL DAILY
COMMUNITY
Start: 2022-09-28

## 2022-12-21 NOTE — PROGRESS NOTES
Adrian is a 47 year old who is being evaluated via a billable video visit.      How would you like to obtain your AVS? MyChart  If the video visit is dropped, the invitation should be resent by: Send to e-mail at: [email or web address]  Will anyone else be joining your video visit? No    Erin Villafana    Video-Visit Details    Type of service:  Video Visit     Originating Location (pt. Location): Home    Distant Location (provider location):  Off-site  Platform used for Video Visit: [employer]    Chief complaint: Follow-up on sleep apnea testing    History of Present Illness: 50-year-old gentleman with history of heroin abuse currently on Suboxone therapy, chronic severe pain, excessive daytime sleepiness.  His sleep is not witnessed.  He had frequent awakenings and home sleep apnea testing was performed to evaluate for sleep apnea.  Since his last visit he actually reports that he is sleeping better.  He has been working closely with pain management and getting physical therapy multiple times a week as well as recurrent injections.  He finds it a little bit easier to fall asleep and wakes up in less pain.  Once he does wake up after 4 to 5 hours he often will get out of bed and will not return to sleep.  Typical bedtime range is 1-2 AM with rise time anywhere between 4:30 AM and 10 AM.  If he gets more sleep he is less sleepy during the day.  He does not think he is sleepier now than he was at his initial visit although [place] rating is higher.  Currently he is sleeping in the couch with his dog.  His dog had been sick and needed to be closer to the door.  He thinks he is ready to go back into the bed.  He does have it adjustable bed.  He continues to drink about 2 monster energy is a day.    [place] Sleepiness Scale  Total score - [place]: 19 (12/16/2022 10:33 AM)   (Less than 10 normal)    Insomnia Severity Scale  CRISTINO Total Score: 12  (normal 0-7, mild 8-14, moderate 15-21, severe 22-28)    Past Medical  "History:   Diagnosis Date     Acute pancreatitis 1999     Chronic pain      Dorsalgia      Heroin abuse (H)      Low testosterone in male      Opioid abuse (H)        No Known Allergies    Current Outpatient Medications   Medication     meloxicam (MOBIC) 7.5 MG tablet     SUBOXONE 8-2 MG per film     testosterone cypionate (DEPOTESTOSTERONE) 200 MG/ML injection     tiZANidine (ZANAFLEX) 4 MG tablet     No current facility-administered medications for this visit.       Social History     Socioeconomic History     Marital status: Single     Spouse name: Not on file     Number of children: Not on file     Years of education: Not on file     Highest education level: Not on file   Occupational History     Not on file   Tobacco Use     Smoking status: Every Day     Types: Cigarettes     Smokeless tobacco: Never   Vaping Use     Vaping Use: Some days   Substance and Sexual Activity     Alcohol use: Not Currently     Drug use: Not Currently     Comment: stapped heroin 1/2015     Sexual activity: Not on file   Other Topics Concern     Not on file   Social History Narrative     Not on file     Social Determinants of Health     Financial Resource Strain: Not on file   Food Insecurity: Not on file   Transportation Needs: Not on file   Physical Activity: Not on file   Stress: Not on file   Social Connections: Not on file   Intimate Partner Violence: Not on file   Housing Stability: Not on file       Family History   Problem Relation Age of Onset     Cancer Mother      Alcoholism Brother      Lung Cancer Maternal Grandfather            EXAM:  Ht 1.702 m (5' 7\")   Wt 90.7 kg (200 lb)   BMI 31.32 kg/m    GENERAL: Alert and no distress, full beard  EYES: Eyes grossly normal to inspection.  No discharge or erythema, or obvious scleral/conjunctival abnormalities.  RESP: No audible wheeze, cough, or visible cyanosis.  No visible retractions or increased work of breathing.    SKIN: Visible skin clear. No significant rash, abnormal " pigmentation or lesions.  NEURO: Cranial nerves grossly intact.  Mentation and speech appropriate for age.  PSYCH: Mentation appears normal, affect normal, judgement and insight intact, normal speech and appearance well-groomed.     HSAT WatchPat  11/11/2022  Weight 200 lbs BMI 31.32   Time Supine 97%  pAHI 9.4, [place] AHI 23.1, Lowest O2 Sat 77%    No results found for: TSH      ASSESSMENT:  47-year-old gentleman with history of heroin abuse on chronic Suboxone therapy, chronic pain in disruptive to sleep, insomnia and now mild sleep apnea.  Patient does not have any interest in trying CPAP therapy he is not sure he wants to even treat the mild sleep apnea specifically.  It is possible that his daytime sleepiness remains related to variable sleep amounts.    PLAN:  Patient is encouraged to return to the bed to sleep with the head of the bed elevated.  This can help with sleep disordered breathing.  He should avoid weight gain.  We discussed the option of oral appliances and CPAP.  He is declining those at this time.  Patient is strongly encouraged to keep detailed sleep logs prior to his upcoming consultation with the sleep insomnia specialist.  Encouraged him to try to get closer to 7 to 8 hours of sleep routinely to see if that helps with daytime symptoms.  If he remains sleepy despite getting adequate sleep amounts routinely consider repeat testing in the sleep lab for more accurate diagnosis of underlying sleep apnea.      39 minutes spent on the date of the encounter doing chart review, history and exam, documentation and further activities per the note    Jennifer Mayberry M.D.  Pulmonary/Critical Care/Sleep Medicine    [facility]   Floor 1, [address]   [address] [place], [place] [address]. [place] [address]   Appointments: [phone]    The above note was dictated using voice recognition software and may include typographical errors. Please contact the author  for any clarifications.

## 2022-12-21 NOTE — PATIENT INSTRUCTIONS
Insomnia and Behavioral Sleep Medicine Program    The [facility] Insomnia and Behavioral Sleep Medicine Program provides non-drug treatment for sleep problems including:    Cognitive-behavioral Therapies for Insomnia (CBT-I)  Management of Shift-work and Jet Lag  Management of Delayed, Advanced and Irregular Circadian Rhythm Sleep Disorders  Imagery Rehearsal Therapy (IRT) for Nightmare Disorder  PAP Therapy Desensitization    You have been referred for consultation with a sleep psychologist who specializes in behavioral sleep medicine and treatment of insomnia.  The [facility] Insomnia and Behavioral Sleep Medicine Program offers individualized telehealth services through our [facility] and online CBT-I.    Preparing for your Consultation    You will need to keep a Sleep Diary for at least a week prior to your visit. Complete the sleep diary each day first thing after you get up by answering a few key questions about your sleep using our convenient mobile jere or paper sleep diary.  Your answers should be based on your recall of the past 24 hours.  Avoid watching the clock or recording data during the night.     Insomnia  Jere    The Insomnia  mobile jere  is a convenient way to keep track of your sleep prior to your sleep consultation.  Simply download the free jere on your Apple or Android phone and record your information each morning.  The jere includes training, self-assessment, and sleep schedule recommendations.  Prior to your consultation we recommend you use only the sleep diary function. You can e-mail yourself a copy of your sleep diary data by going to the Settings section and using the [place] User Data function.  During your consultation your provider will review the data with you.          [facility] Sleep Diary    You can also track your sleep using the [facility] paper sleep diary.  You can upload your sleep diary and send it via a Ksplice  message, fax it to [phone], or have it with you at the time of your consultation.            CBT-I:  Frequently Asked Questions    What is CBT-I?    Cognitive Behavioral Therapy for Insomnia, also known as CBT-I, is a highly effective non-drug treatment for insomnia. The American College of Physicians recommends CBT-I as the first treatment for chronic insomnia.  Research has shown CBT-I to be safer and more effective long term than sleeping pills.    What does CBT-I involve?     CBT-I targets behaviors that lead to chronic insomnia:  Habits that weaken the bed as a cue for sleep  Habits that weaken your body's sleep drive and sleep/wake clock   Unhelpful sleep thoughts that increase sleep-related worry and arousal.    The process involves 3-6 telehealth visits that guide you to implement proven strategies to get a better night's sleep.    People often see improvement in their sleep within a few weeks. Research shows if you keep practicing the skills you learn your sleep is likely to continue to improve 6-12 months after treatment.    Does this program prescribe or manage sleep medication?    No.  Your prescribing provider is responsible to assist you in managing your sleep medications.  Some people choose to stop using sleep medication prior to or during CBT-I.  Our program can work with your prescribing provider to help reduce or eliminate use of sleep medications.     Getting Started Today!    If you haven't already done so, we recommend you consider making the following changes to your sleep habits prior to your sleep consultation:     Reduce your consumption of caffeine and alcohol.  Both can disrupt sleep and make strengthening your sleep more difficult.  Specifically:    - Avoid caffeine within 6 hours of bedtime   - No more than 3 caffeinated beverages per day (e.g. 8 oz. cup coffee or 12 oz. cup soda)            - No alcohol within 3 hours of bedtime    Make sure your bedroom is quiet, comfortable and  dark.  Noise, light and an uncomfortable sleep space can harm your sleep.      Keep the same sleep schedule 7 days a week.unless you do shift work.      Online CBT-I     If you want to get started today, research indicates that online CBT-I can be effective for some individuals. These programs requires comfort with julia-based or online learning.  However, digital CBT-I programs are not for everyone.  Contraindications include:    Seizure disorders,   Bipolar disorder,   Unstable medical or mental health conditions,   Frailty or risk of falling  Pregnancy    You should consult a sleep specialist before using these resources if you have:    Sleep Apnea  Restless Leg Syndrome  Sleep Walking  REM behavior disorder  Night Terrors  Excessive Daytime Sleepiness  Are engaged in shift work  Use prescription sleep medication    Our Online CBT-I program    If your sleep provider recommends online CBT-I for you , the cost for an entire 6-week program is $40.    To get started, copy and paste the link below which will take you to the landing page to register:                           [email or web address]               If you wish to complete the online CBT-I program but do not plan to follow-up with a sleep provider, you are set to begin the program.    If you are planning to work with an [facility] sleep provider, there are a couple of extra steps you can take to share your sleep data with your sleep provider.  To share sleep log data, go to the left side navigation and click on the  share sleep log  button:         You will be taken to the page below where you will enter  the provider code [facility] into the box.          Once you press the locate button, the information for [facility] will pop up as below.  By pressing the Submit button your data will be sent to our  secure [facility] sleep program portal for review by your sleep provider. You will only need to do this step once.                                   Self-help Workbooks for Insomnia    If you have found self-help books useful in the past, you may want to consider reading one of the following books prior to your consultation:    Say Cinthia to Insomnia: The Six-Week, Drug-Free Program Developed at [facility].  Jon Kerns MD. Available in paperback, Rolan, and audiobook.    Overcoming Insomnia: A Cognitive-Behavioral Therapy Approach, Workbook.  Davi Coyne, PhD  and Kiesha Asher, PhD.  Available in paperback and Rolan.    Quiet Your Mind and Get to Sleep: Solutions to Insomnia for Those with Depression, Anxiety, or Chronic Pain.  Josefina Jones, PhD and Kiesha Asher, PhD.  Available in paperback and Rolan

## 2022-12-22 NOTE — NURSING NOTE
AVS, insomnia consult information, and sleep diary sheet sent via mail to home.     ALFREDA Salas  [facility] [place]

## 2023-02-02 ENCOUNTER — ALLIED HEALTH/NURSE VISIT (OUTPATIENT)
Dept: RESEARCH | Facility: CLINIC | Age: 48
End: 2023-02-02
Payer: COMMERCIAL

## 2023-02-02 VITALS
RESPIRATION RATE: 16 BRPM | HEART RATE: 74 BPM | WEIGHT: 205 LBS | DIASTOLIC BLOOD PRESSURE: 93 MMHG | OXYGEN SATURATION: 98 % | HEIGHT: 67 IN | BODY MASS INDEX: 32.18 KG/M2 | SYSTOLIC BLOOD PRESSURE: 149 MMHG | TEMPERATURE: 97.8 F

## 2023-02-02 DIAGNOSIS — Z00.6 EXAMINATION OF PARTICIPANT OR CONTROL IN CLINICAL RESEARCH: Primary | ICD-10-CM

## 2023-02-02 PROCEDURE — 99207 PR NO CHARGE NURSE ONLY: CPT

## 2023-02-02 NOTE — PROGRESS NOTES
[place] In-Person Study Note    Study Description: The purpose of this study is to collect sleep data for product research and development. We will compare the performance of the Smartwatch to a medical-grade Home Sleep Test (HST). We hope to learn whether the Smartwatch can be used to track sleep quality at home.       Subject ID:      SCREENING     Demographic Info  Adrian Yin   1975          47 year old  male    Race: White  Ethnicity: Non-/     Motley Scale: OBAN Motley: 3    Medical History:  Past Medical History:   Diagnosis Date     Chronic pain      Opioid abuse (H)    - Low Testosterone    Sleep Apnea Diagnosis: No    Allergies:  No Known Allergies     Current Medications:     Review of your medicines          Accurate as of February 2, 2023  9:48 AM. If you have any questions, ask your nurse or doctor.            CONTINUE these medicines which have NOT CHANGED      Dose / Directions   meloxicam 7.5 MG tablet  Commonly known as: MOBIC  Indication: Chronic Pain   Dose: 7.5 mg  Take 7.5 mg by mouth daily  Refills: 0  Start Date: 9/28/2022, Ongoing   Suboxone 8-2 MG per film  Generic drug: buprenorphine HCl-naloxone HCl   Indication: Obioid Abuse Recovery   TAKE 2 FILM STRIPS UNDER THE TONGUE EVERY DAY FOR RECOVERY  Refills: 0  Start Date: 9/13/2022, Ongoing   testosterone cypionate 200 MG/ML injection  Commonly known as: DEPOTESTOSTERONE   Indication: Low Testosterone   Dose: 50 mg  Inject 50 mg into the muscle every 14 days  Refills: 0  Start Date: 9/20/2022, Ongoing   tiZANidine 4 MG tablet  Commonly known as: ZANAFLEX   Indication: Chronic Pain   Dose: 4 mg  Take 4 mg by mouth nightly as needed  Refills: 0  Start Date: 10/24/2022, Ongoing          Past Surgical History:  No past surgical history on file.           Vitals:  Time Subject Sat: 0915   BP (!) 149/93 (BP Location: Right arm, Patient Position: Chair, Cuff Size: Adult Large)   Pulse 74   Temp 97.8  F  "(36.6  C) (Oral)   Resp 16   Ht 1.702 m (5' 7\")   Wt 93 kg (205 lb)   SpO2 98%   BMI 32.11 kg/m         Lifestyle:  Occupation: Supervisor at [employer]    Do you have a Bed Partner/Co-Sleeper? Yes   Previous Sleep Study?: No       (If Yes) Initial AHI Score: N/A    Alcohol Use: 5 drinks/week  Smoking History: 140 cigarettes/week      Measurements & Preferences:  Dominant Hand: Right   Preferred Watch Wrist: Left    Left Wrist:  Circumference (mm): 193   Hairiness: C: Thick Hair, Low Density  Tattoos, Moles, Scars? None    Right Wrist:   Circumference (mm): 193   Hairiness: C: Thick Hair, Low Density  Tattoos, Moles, Scars? None    Was data collected?: Yes    ENROLLMENT & DEVICES      Device IDs:  Watch ID: [id number]    Watch Size: 44 mm   Band Size: M/L  Natural Notch: 4   Secure Notch: 4   Watch Setting Worn: 4   Phone ID: [id number]  Nox ID: [id number]     Has the Subject Enrolled in the Study Jere: Yes   Confirmation of Enrollment?: Yes   Enrollment Date: 2-FEB-2023  Smartwatch Training Completed? Yes   Smartwatch Training Date: 2-FEB-2023  HSAT Training Completed? Yes   HSAT Training Date: 2-FEB-2023 2-FEB-2023  Tejas Lovett"

## 2023-02-02 NOTE — PROGRESS NOTES
[place] Study Consent    Study Description: The purpose of this study is to collect sleep data for product research and development. We will compare the performance of the Smartwatch to a medical-grade Home Sleep Test (HST). We hope to learn whether the Smartwatch can be used to track sleep quality at home.        Adrian Yin a 47 year old male, was on-site  today to discuss participation in the [place] sleep data collection study.     The consent form was reviewed with the patient.     The review of the study included:    Study Purpose     COVID-19 Criteria     Participant Responsibilities      Study Data and Devices    Benefits and Risks of Participation    Compensation and Costs of Participation    Voluntary Participation    Confidentiality     Injury and Legal Rights    Protocol Version: 3.0    The subject was queried in regards to his willingness to continue and his questions were answered to his satisfaction.     The patient has given his agreement to volunteer and participate in the above noted study.     The Consent  and HIPAA form version 3.0 6-JAN-2023 was signed at 09:30  on  2-FEB-2023 with the Clinical Research Unit of [facility].     A copy of the [place] consent will be placed in subject's medical record. A copy of the consent form was given to the subject today.    Study data is directly entered into Epic and Maptia per protocol.     No study procedures were done prior to Adrian Yin providing informed consent.       2-FEB-2023    Tejas Lovett

## 2023-02-02 NOTE — PROGRESS NOTES
"  [place] Sleep Study Inclusion/Exclusion Criteria:    Study Name: [place]  : Joaquin Stoddard MD    Study Description: The purpose of this study is to collect sleep data for product research and development. We will compare the performance of the Smartwatch to a medical-grade Home Sleep Test (HST). We hope to learn whether the Smartwatch can be used to track sleep quality at home.    Protocol Version: 3.0  22-DEC-2022     Criteria #  Inclusion Criteria (ALL MUST BE YES)  YES/NO/N/A   1  Adult (age > 18 years old) Yes   2  Subject has a reliable WiFi network (examples of \"reliable\": able to video-conference call with a clear image, able to stream movies or video without interruption, play online computer games) Yes   3  Subject has access to a computer or smartphone with audiovisual capabilities (e.g., webcam and microphone/speaker*)  Yes   4  Subject is willing to comply with the study procedures    Yes         * applicable for subjects that are remotely consented and/or trained.     Criteria # Exclusion Criteria (ALL MUST BE NO) YES/NO/N/A   1  Active COVID infection   No   2  Decisionally impaired participants (no surrogate consenting is allowed)    No   3  Not understanding written and spoken English     No   4  Open wounds(s) on the wrist and/or forearm (in area where Smartwatch would be worn) No   5  Tattoos, large moles or scars on the dorsal aspect of wrist where the Smartwatch would be worn; individuals with tattoo on only one wrist may participate, if Smartwatch is worn on non-tattooed wrist    No   6  Known sensitivity or allergy to component of the Smartwatch   No   7  Planned travel across 2 or more times zones during study participation   No   8  Planned travel away from home for more than 5 days during the study period No   9  Overnight rotating shift work     No   10  Active and adherent to treatment for sleep apnea   (e.g., CPAP, dental appliance, Hypoglossal nerve stimulator)    " No   11  Plans to start treatment for apnea within the study timeframe    No   12  Overnight supplemental oxygen use   No   13    Employed by a company that develops or sells medical and/or fitness devices (e.g., ECG monitors, wearable fitness bands, sleep monitors, etc.) or are technology journalists (e.g., professional bloggers, TV, magazine, newspaper reporters, etc.) No   14    Participated in a previous sleep study that used a wrist-worn sensor device by same sponsor  No     Patient does fulfill study inclusion criteria and no exclusion criteria are found.     Joaquin Stoddard MD    2-FEB-2023    Tejas Lovett

## 2023-02-03 ENCOUNTER — VIRTUAL VISIT (OUTPATIENT)
Dept: RESEARCH | Facility: CLINIC | Age: 48
End: 2023-02-03
Payer: COMMERCIAL

## 2023-02-03 DIAGNOSIS — Z00.6 EXAMINATION OF PARTICIPANT OR CONTROL IN CLINICAL RESEARCH: Primary | ICD-10-CM

## 2023-02-03 PROCEDURE — 99207 PR NO CHARGE NURSE ONLY: CPT

## 2023-02-03 NOTE — PROGRESS NOTES
[place] HSAT Phone Visit    Study Description: The purpose of this study is to collect sleep data for product research and development. We will compare the performance of the Smartwatch to a medical-grade Home Sleep Test (HST). We hope to learn whether the Smartwatch can be used to track sleep quality at home.      Subject Number:     Study Day: Day 4    [place] Study Subject was called today to review HSAT expectations and requirements prior to their first night.     Are you on your home WiFi and have you been completing your Evening/Morning survey? Yes    Subject instructed to wear HSAT devices per previous discussion with research coordinator and will conduct HSAT evenings this weekend. Participant was made aware that due to an upstream sponsor issue, we may request another set of nights for the HSAT. Participant was comfortable with this plan, all other questions answered.     Reminders Checklist:   [x] Complete Evening Task prior to HSAT Workflow/Watching the video   [x]Make sure Red light comes on, if not DO NOT ATTEMPT  [x] Secure nasal cannula and finger sensor with medical tape  [x] Nox recording turned off in the morning following HSAT night   [x] Ensure morning survey is completed for participant's data upload    Helpful Hints:  -Wear a T-shirt over the heart monitor   -Double tape device tubing/wires     Adverse Events & Con Med Assessment Performed?   [x]     (If yes, please generate adverse event report for PI to thuan)      03-FEB-2023    Barrera Rodriguez

## 2023-02-07 ENCOUNTER — ALLIED HEALTH/NURSE VISIT (OUTPATIENT)
Dept: RESEARCH | Facility: CLINIC | Age: 48
End: 2023-02-07
Payer: COMMERCIAL

## 2023-02-07 DIAGNOSIS — Z00.6 EXAMINATION OF PARTICIPANT OR CONTROL IN CLINICAL RESEARCH: Primary | ICD-10-CM

## 2023-02-07 PROCEDURE — 99207 PR NO CHARGE NURSE ONLY: CPT

## 2023-02-07 NOTE — PROGRESS NOTES
. [place] HSAT Kit Return  Study Description: The purpose of this study is to collect sleep data for product research and development. We will compare the performance of the Smartwatch to a medical-grade Home Sleep Test (HST). We hope to learn whether the Smartwatch can be used to track sleep quality at home.      Subject Number:     Study Day: Day 7    Tracking Number: N/A    Compliance Questions:  Did you wear a T-Shirt over the heart monitor? Yes  Did you double tape device tubing/wires?Yes  Did you turn-off your heart monitor each morning after collection?Yes  Did all equipment function correctly and stay-on throughout night?Yes  Did you see the red light underneath the watch turn-on both nights?Yes      Participant returned HSAT kit with all devices returned. Participant verbalized understanding that if their data is unusable per sponsor, they will conduct one additional set of HSAT recordings. All other questions/concerns addressed.     Adverse Events & Con Med Assessment Performed?   [x]     (If yes, please generate adverse event report for PI to thuan)    07-FEB-2023    Barrera Rodriguez

## 2023-02-15 ENCOUNTER — VIRTUAL VISIT (OUTPATIENT)
Dept: RESEARCH | Facility: CLINIC | Age: 48
End: 2023-02-15
Payer: COMMERCIAL

## 2023-02-15 DIAGNOSIS — Z00.6 EXAMINATION OF PARTICIPANT OR CONTROL IN CLINICAL RESEARCH: Primary | ICD-10-CM

## 2023-02-15 PROCEDURE — 99207 PR NO CHARGE NURSE ONLY: CPT

## 2023-02-15 NOTE — PROGRESS NOTES
" [place] Study Phone Visit    Study Description: The purpose of this study is to collect sleep data for product research and development. We will compare the performance of the Smartwatch to a medical-grade Home Sleep Test (HST). We hope to learn whether the Smartwatch can be used to track sleep quality at home.      Subject Number:     Study Day: Week 3    [place] Study Subject was called today to:    Review Compliance     Answer subject questions    Deliver study protocol reminders to subject    Reminders Checklist:   [x]  Connected to WiFi  [x]  Completing both morning and evening surveys  [x]  Watch and Phone on  near each other after completed morning survey   [x]  Take devices off before showering/getting them wet  [x]  Make sure to dismiss any update notifications. -Tap \"Not Now\"  [x]  Good HSAT   [x]  Remind them of next appointment with us         Adverse Events & Con Med Assessment Performed?   [x]     (If yes, please generate adverse event report for PI to thuan)      15-FEB-2023    Ashish Persaud    "

## 2023-02-20 ENCOUNTER — TELEPHONE (OUTPATIENT)
Dept: RESEARCH | Facility: CLINIC | Age: 48
End: 2023-02-20
Payer: COMMERCIAL

## 2023-02-20 NOTE — LETTER
[address]  [place] [address]  Phone: [phone]  Fax: [phone]      February 20, 2023      RE: Adrian Yin  [address]  [place] [address]    [place] [id number] [place] [address]  [phone]    Dear NP Nabila Dobosn,    Thank you for allowing your patient, Adrian Yin (1975) to participate in the [place] Study which is a sleep evaluation study related to wearable devices, and it was noted your patient has mild to moderate sleep apnea. This patient's Home Sleep Apnea Test scores are under the media tab for you to review in Epic and a copy of the report is enclosed.      I have informed your patient of these findings in the report and recommended the patient follow up with you.    With Kind Regards,    Love Read   Adult and Pediatric Nurse Practitioner [facility] DNP

## 2023-02-21 NOTE — TELEPHONE ENCOUNTER
[place] HSAT Results Phone Call  Study Description: The purpose of this study is to collect sleep data for product research and development. We will compare the performance of the Smartwatch to a medical-grade Home Sleep Test (HST). We hope to learn whether the Smartwatch can be used to track sleep quality at home.      Adrian Yin was called today to review results of his Home Sleep Test (HSAT).     They were informed of the significance of the results and they affirmed understanding. Additionally I informed them that these results are available for review by their provider in their chart.  They had no further questions at this time. Patient will follow up with primary related to these results. Patient's primary is Nabila Dobson at [facility] and sent patient's primary a letter.     Additional Comments: HR  < 88% for 20.5 minues     AHI Score: AHI of 15 to less than 30 (Moderate RUTH) 22.3, 22.6,  Day 2 mild  9.4,  9.6    Results:  NIGHT 1  Was it scorable?: Yes     Channel Presence: Scored and all 6 channels present  Select Missing Channels: N/A     (If applicable)      Analysis Start Date: 2/5/2023   Analysis Duration: 9hr 10min  Analysis Start Time: 11:36 pm     Analysis Stop Time: 8:46 am   Est Total Sleep Time: 8 hr 41 min      NIGHT 1 Scorer 1 Scorer 2   Respiratory Parameters   Apnea + Hypopneas (AH)   Total    22.3 /h   22.6 /h   Supine    22.3 /h   22.6 /h   Non-Supine   0 /h   0 /h   Count   194    196        Total Total    Obstructive (OA)    11.9 /h   9.7 /h   Hypopneas    10.5 /h   12.9 /h   Central Apnea Hypopnea (CA+CH)    0 /h   0 /h   Oxygen Saturation (SpO2)   Oxygen Desaturation Index (SHAHEEN)    20.4 /h   20.4 /h   Minimum SpO2    76 %   76 %   SpO2 Duration < 88%    3.9 %   3.9 %   Average Desat Drop    7.6 %   7.6 %   Position & Analysis Time   Supine (in TST) Duration    521.2 min   521.2 min       NIGHT 2  Was it scorable?: Yes (If no, delete the table below)      Channel Presence: Scored and all 6 channels present  Select Missing Channels: N/A     (If applicable)    Analysis Start Date: 2/7/2023    Analysis Duration: 5 hr 5 min  Analysis Start Time: 3:11 am        Analysis Stop Time: 8:17 am    Est Total Sleep Time: 5 hr 5 min       NIGHT 2 Scorer 1 Scorer 2   Respiratory Parameters   Apnea + Hypopneas (AH)   Total    9.4 /h   9.6 /h   Supine    9.4 /h   9.6 /h   Non-Supine   0 /h   0 /h   Count   48    49        Total Total    Obstructive (OA)    4.3 /h   3.7 /h   Hypopneas    4.9 /h   5.9 /h   Central Apnea Hypopnea (CA+CH)    0.2 /h   0 /h   Oxygen Saturation (SpO2)   Oxygen Desaturation Index (SHAHEEN)    8.6 /h   8.6 /h   Minimum SpO2    74 %   74 %   SpO2 Duration < 88%    1.2 %   1.2 %   Average Desat Drop    6.9 %   6.9 %   Position & Analysis Time   Supine (in TST) Duration    305.8 min   305.8 min       20-FEB-2023    Love Read NP

## 2023-02-22 ENCOUNTER — VIRTUAL VISIT (OUTPATIENT)
Dept: RESEARCH | Facility: CLINIC | Age: 48
End: 2023-02-22
Payer: COMMERCIAL

## 2023-02-22 DIAGNOSIS — Z00.6 EXAMINATION OF PARTICIPANT OR CONTROL IN CLINICAL RESEARCH: Primary | ICD-10-CM

## 2023-02-22 PROCEDURE — 99207 PR NO CHARGE NURSE ONLY: CPT

## 2023-02-22 NOTE — PROGRESS NOTES
" [place] Study Phone Visit    Study Description: The purpose of this study is to collect sleep data for product research and development. We will compare the performance of the Smartwatch to a medical-grade Home Sleep Test (HST). We hope to learn whether the Smartwatch can be used to track sleep quality at home.      Subject Number:     Study Day: Week 4    [place] Study Subject was called today to:    Review Compliance     Answer subject questions    Deliver study protocol reminders to subject    Reminders Checklist:   [x]  Connected to WiFi  [x]  Completing both morning and evening surveys  [x]  Watch and Phone on  near each other after completed morning survey   [x]  Take devices off before showering/getting them wet  [x]  Make sure to dismiss any update notifications. -Tap \"Not Now\"  [x]  Good HSAT   [x]  Remind them of next appointment with us     (Applicable during last 4 days)  [x]  NO WATCH WEAR JUST SURVEY     Adverse Events & Con Med Assessment Performed?   [x]     (If yes, please generate adverse event report for PI to cosign)      22-FEB-2023    Barrera Rodriguez      "

## 2023-02-27 NOTE — PROGRESS NOTES
" [place] Study Phone Visit    Study Description: The purpose of this study is to collect sleep data for product research and development. We will compare the performance of the Smartwatch to a medical-grade Home Sleep Test (HST). We hope to learn whether the Smartwatch can be used to track sleep quality at home.      Subject Number:     Study Day: Week 5    [place] Study Subject was called today to:    Review Compliance -Good    Answer subject questions    Deliver study protocol reminders to subject    Reminders Checklist:   [x]  Connected to WiFi  [x]  Completing both morning and evening surveys  [x]  Watch and Phone on  near each other after completed morning survey   [x]  Take devices off before showering/getting them wet  [x]  Make sure to dismiss any update notifications. -Tap \"Not Now\"  [x]  Good HSAT   [x]  Remind them of next appointment with us     (Applicable during last 4 days)  [x]  NO WATCH WEAR JUST SURVEY     Adverse Events & Con Med Assessment Performed?   [x] None    (If yes, please generate adverse event report for PI to cosign)      27-FEB-2023    Erin Randhawa RN    "

## 2023-03-01 ENCOUNTER — VIRTUAL VISIT (OUTPATIENT)
Dept: RESEARCH | Facility: CLINIC | Age: 48
End: 2023-03-01
Payer: COMMERCIAL

## 2023-03-01 DIAGNOSIS — Z00.6 RESEARCH SUBJECT: Primary | ICD-10-CM

## 2023-03-08 ENCOUNTER — ALLIED HEALTH/NURSE VISIT (OUTPATIENT)
Dept: RESEARCH | Facility: CLINIC | Age: 48
End: 2023-03-08
Payer: COMMERCIAL

## 2023-03-08 DIAGNOSIS — Z00.6 EXAMINATION OF PARTICIPANT OR CONTROL IN CLINICAL RESEARCH: Primary | ICD-10-CM

## 2023-03-08 PROCEDURE — 99207 PR NO CHARGE NURSE ONLY: CPT

## 2023-03-08 NOTE — PROGRESS NOTES
[place] Study End Note    Study Description: The purpose of this study is to collect sleep data for product research and development. We will compare the performance of the Smartwatch to a medical-grade Home Sleep Test (HST). We hope to learn whether the Smartwatch can be used to track sleep quality at home.        Study Termination/Completion Date: 08-MAR-2023  Reason for Termination (If Applicable): Completed     Subject returned all study devices today and this completes this study for the subject.    Adverse Events & Con Med Assessment Performed?   [x]       08-MAR-2023    Barrera Rodriguez

## 2023-10-14 ENCOUNTER — HEALTH MAINTENANCE LETTER (OUTPATIENT)
Age: 48
End: 2023-10-14

## 2024-11-20 ENCOUNTER — HOSPITAL ENCOUNTER (INPATIENT)
Facility: CLINIC | Age: 49
End: 2024-11-20
Attending: STUDENT IN AN ORGANIZED HEALTH CARE EDUCATION/TRAINING PROGRAM | Admitting: STUDENT IN AN ORGANIZED HEALTH CARE EDUCATION/TRAINING PROGRAM
Payer: COMMERCIAL

## 2024-11-20 DIAGNOSIS — F10.931 ALCOHOL WITHDRAWAL SYNDROME, WITH DELIRIUM (H): Primary | ICD-10-CM

## 2024-11-20 DIAGNOSIS — F10.90 ALCOHOL USE DISORDER: ICD-10-CM

## 2024-11-20 PROBLEM — K12.2 ABSCESS OF SUBMANDIBULAR REGION: Status: ACTIVE | Noted: 2024-02-14

## 2024-11-20 PROBLEM — M45.0 ANKYLOSING SPONDYLITIS OF MULTIPLE SITES IN SPINE (H): Status: ACTIVE | Noted: 2024-02-14

## 2024-11-20 PROBLEM — R79.82 ELEVATED C-REACTIVE PROTEIN (CRP): Status: ACTIVE | Noted: 2024-02-14

## 2024-11-20 PROBLEM — R79.89 LOW TESTOSTERONE: Status: ACTIVE | Noted: 2024-02-14

## 2024-11-20 PROBLEM — F11.11 HISTORY OF HEROIN ABUSE (H): Status: ACTIVE | Noted: 2024-02-14

## 2024-11-20 LAB
ALBUMIN SERPL BCG-MCNC: 4.6 G/DL (ref 3.5–5.2)
ALP SERPL-CCNC: 238 U/L (ref 40–150)
ALT SERPL W P-5'-P-CCNC: 87 U/L (ref 0–70)
ANION GAP SERPL CALCULATED.3IONS-SCNC: 17 MMOL/L (ref 7–15)
AST SERPL W P-5'-P-CCNC: 92 U/L (ref 0–45)
ATRIAL RATE - MUSE: 90 BPM
BASOPHILS # BLD AUTO: 0 10E3/UL (ref 0–0.2)
BASOPHILS NFR BLD AUTO: 0 %
BILIRUB SERPL-MCNC: 1.6 MG/DL
BUN SERPL-MCNC: 9.3 MG/DL (ref 6–20)
CALCIUM SERPL-MCNC: 9.7 MG/DL (ref 8.8–10.4)
CHLORIDE SERPL-SCNC: 91 MMOL/L (ref 98–107)
CREAT SERPL-MCNC: 0.8 MG/DL (ref 0.67–1.17)
DIASTOLIC BLOOD PRESSURE - MUSE: NORMAL MMHG
EGFRCR SERPLBLD CKD-EPI 2021: >90 ML/MIN/1.73M2
EOSINOPHIL # BLD AUTO: 0.2 10E3/UL (ref 0–0.7)
EOSINOPHIL NFR BLD AUTO: 2 %
ERYTHROCYTE [DISTWIDTH] IN BLOOD BY AUTOMATED COUNT: 13.5 % (ref 10–15)
ETHANOL SERPL-MCNC: <0.01 G/DL
GIANT PLATELETS BLD QL SMEAR: SLIGHT
GLUCOSE SERPL-MCNC: 90 MG/DL (ref 70–99)
HCO3 SERPL-SCNC: 25 MMOL/L (ref 22–29)
HCT VFR BLD AUTO: 38.5 % (ref 40–53)
HGB BLD-MCNC: 13.8 G/DL (ref 13.3–17.7)
IMM GRANULOCYTES # BLD: 0 10E3/UL
IMM GRANULOCYTES NFR BLD: 0 %
INTERPRETATION ECG - MUSE: NORMAL
LYMPHOCYTES # BLD AUTO: 0.8 10E3/UL (ref 0.8–5.3)
LYMPHOCYTES NFR BLD AUTO: 8 %
MAGNESIUM SERPL-MCNC: 2.5 MG/DL (ref 1.7–2.3)
MCH RBC QN AUTO: 34.5 PG (ref 26.5–33)
MCHC RBC AUTO-ENTMCNC: 35.8 G/DL (ref 31.5–36.5)
MCV RBC AUTO: 96 FL (ref 78–100)
MONOCYTES # BLD AUTO: 1 10E3/UL (ref 0–1.3)
MONOCYTES NFR BLD AUTO: 10 %
NEUTROPHILS # BLD AUTO: 8.3 10E3/UL (ref 1.6–8.3)
NEUTROPHILS NFR BLD AUTO: 80 %
NRBC # BLD AUTO: 0 10E3/UL
NRBC BLD AUTO-RTO: 0 /100
P AXIS - MUSE: 27 DEGREES
PHOSPHATE SERPL-MCNC: 1.8 MG/DL (ref 2.5–4.5)
PLAT MORPH BLD: ABNORMAL
PLATELET # BLD AUTO: 115 10E3/UL (ref 150–450)
POTASSIUM SERPL-SCNC: 3.6 MMOL/L (ref 3.4–5.3)
PR INTERVAL - MUSE: 162 MS
PROT SERPL-MCNC: 7.4 G/DL (ref 6.4–8.3)
QRS DURATION - MUSE: 94 MS
QT - MUSE: 366 MS
QTC - MUSE: 447 MS
R AXIS - MUSE: -21 DEGREES
RBC # BLD AUTO: 4 10E6/UL (ref 4.4–5.9)
RBC MORPH BLD: ABNORMAL
SODIUM SERPL-SCNC: 133 MMOL/L (ref 135–145)
SYSTOLIC BLOOD PRESSURE - MUSE: NORMAL MMHG
T AXIS - MUSE: 4 DEGREES
VENTRICULAR RATE- MUSE: 90 BPM
WBC # BLD AUTO: 10.4 10E3/UL (ref 4–11)

## 2024-11-20 PROCEDURE — 99418 PROLNG IP/OBS E/M EA 15 MIN: CPT | Performed by: STUDENT IN AN ORGANIZED HEALTH CARE EDUCATION/TRAINING PROGRAM

## 2024-11-20 PROCEDURE — 84100 ASSAY OF PHOSPHORUS: CPT | Performed by: STUDENT IN AN ORGANIZED HEALTH CARE EDUCATION/TRAINING PROGRAM

## 2024-11-20 PROCEDURE — 99285 EMERGENCY DEPT VISIT HI MDM: CPT | Mod: 25

## 2024-11-20 PROCEDURE — 96361 HYDRATE IV INFUSION ADD-ON: CPT

## 2024-11-20 PROCEDURE — 93005 ELECTROCARDIOGRAM TRACING: CPT

## 2024-11-20 PROCEDURE — 96374 THER/PROPH/DIAG INJ IV PUSH: CPT

## 2024-11-20 PROCEDURE — 250N000013 HC RX MED GY IP 250 OP 250 PS 637: Performed by: STUDENT IN AN ORGANIZED HEALTH CARE EDUCATION/TRAINING PROGRAM

## 2024-11-20 PROCEDURE — 250N000011 HC RX IP 250 OP 636: Performed by: STUDENT IN AN ORGANIZED HEALTH CARE EDUCATION/TRAINING PROGRAM

## 2024-11-20 PROCEDURE — 258N000003 HC RX IP 258 OP 636: Performed by: STUDENT IN AN ORGANIZED HEALTH CARE EDUCATION/TRAINING PROGRAM

## 2024-11-20 PROCEDURE — HZ2ZZZZ DETOXIFICATION SERVICES FOR SUBSTANCE ABUSE TREATMENT: ICD-10-PCS | Performed by: STUDENT IN AN ORGANIZED HEALTH CARE EDUCATION/TRAINING PROGRAM

## 2024-11-20 PROCEDURE — 36415 COLL VENOUS BLD VENIPUNCTURE: CPT | Performed by: STUDENT IN AN ORGANIZED HEALTH CARE EDUCATION/TRAINING PROGRAM

## 2024-11-20 PROCEDURE — 99223 1ST HOSP IP/OBS HIGH 75: CPT | Performed by: STUDENT IN AN ORGANIZED HEALTH CARE EDUCATION/TRAINING PROGRAM

## 2024-11-20 PROCEDURE — 96376 TX/PRO/DX INJ SAME DRUG ADON: CPT

## 2024-11-20 PROCEDURE — 120N000001 HC R&B MED SURG/OB

## 2024-11-20 PROCEDURE — 80053 COMPREHEN METABOLIC PANEL: CPT | Performed by: STUDENT IN AN ORGANIZED HEALTH CARE EDUCATION/TRAINING PROGRAM

## 2024-11-20 PROCEDURE — 85004 AUTOMATED DIFF WBC COUNT: CPT | Performed by: STUDENT IN AN ORGANIZED HEALTH CARE EDUCATION/TRAINING PROGRAM

## 2024-11-20 PROCEDURE — 82077 ASSAY SPEC XCP UR&BREATH IA: CPT | Performed by: STUDENT IN AN ORGANIZED HEALTH CARE EDUCATION/TRAINING PROGRAM

## 2024-11-20 PROCEDURE — 83735 ASSAY OF MAGNESIUM: CPT | Performed by: STUDENT IN AN ORGANIZED HEALTH CARE EDUCATION/TRAINING PROGRAM

## 2024-11-20 RX ORDER — FOLIC ACID 1 MG/1
1 TABLET ORAL DAILY
Status: DISCONTINUED | OUTPATIENT
Start: 2024-11-21 | End: 2024-11-24 | Stop reason: HOSPADM

## 2024-11-20 RX ORDER — THIAMINE HYDROCHLORIDE 100 MG/ML
250 INJECTION, SOLUTION INTRAMUSCULAR; INTRAVENOUS DAILY
Status: DISCONTINUED | OUTPATIENT
Start: 2024-11-23 | End: 2024-11-23

## 2024-11-20 RX ORDER — ALBUTEROL SULFATE 90 UG/1
2 INHALANT RESPIRATORY (INHALATION) EVERY 6 HOURS PRN
COMMUNITY

## 2024-11-20 RX ORDER — THIAMINE HYDROCHLORIDE 100 MG/ML
500 INJECTION, SOLUTION INTRAMUSCULAR; INTRAVENOUS 3 TIMES DAILY
Status: COMPLETED | OUTPATIENT
Start: 2024-11-21 | End: 2024-11-22

## 2024-11-20 RX ORDER — FOLIC ACID 5 MG/ML
1 INJECTION, SOLUTION INTRAMUSCULAR; INTRAVENOUS; SUBCUTANEOUS ONCE
Status: DISCONTINUED | OUTPATIENT
Start: 2024-11-20 | End: 2024-11-20

## 2024-11-20 RX ORDER — DEXTROSE MONOHYDRATE AND SODIUM CHLORIDE 5; .9 G/100ML; G/100ML
INJECTION, SOLUTION INTRAVENOUS CONTINUOUS
Status: DISCONTINUED | OUTPATIENT
Start: 2024-11-20 | End: 2024-11-21

## 2024-11-20 RX ORDER — DIAZEPAM 10 MG/2ML
5-10 INJECTION, SOLUTION INTRAMUSCULAR; INTRAVENOUS EVERY 30 MIN PRN
Status: DISCONTINUED | OUTPATIENT
Start: 2024-11-20 | End: 2024-11-24 | Stop reason: HOSPADM

## 2024-11-20 RX ORDER — MULTIPLE VITAMINS W/ MINERALS TAB 9MG-400MCG
1 TAB ORAL ONCE
Status: COMPLETED | OUTPATIENT
Start: 2024-11-20 | End: 2024-11-20

## 2024-11-20 RX ORDER — HALOPERIDOL 5 MG/ML
2.5-5 INJECTION INTRAMUSCULAR EVERY 6 HOURS PRN
Status: DISCONTINUED | OUTPATIENT
Start: 2024-11-20 | End: 2024-11-24 | Stop reason: HOSPADM

## 2024-11-20 RX ORDER — GABAPENTIN 300 MG/1
300 CAPSULE ORAL EVERY 8 HOURS
Status: DISCONTINUED | OUTPATIENT
Start: 2024-11-20 | End: 2024-11-21

## 2024-11-20 RX ORDER — FOLIC ACID 1 MG/1
1 TABLET ORAL DAILY
Status: DISCONTINUED | OUTPATIENT
Start: 2024-11-21 | End: 2024-11-20

## 2024-11-20 RX ORDER — THIAMINE HYDROCHLORIDE 100 MG/ML
100 INJECTION, SOLUTION INTRAMUSCULAR; INTRAVENOUS ONCE
Status: DISCONTINUED | OUTPATIENT
Start: 2024-11-20 | End: 2024-11-20

## 2024-11-20 RX ORDER — FOLIC ACID 1 MG/1
1 TABLET ORAL ONCE
Status: COMPLETED | OUTPATIENT
Start: 2024-11-20 | End: 2024-11-20

## 2024-11-20 RX ORDER — DIAZEPAM 5 MG/1
10 TABLET ORAL EVERY 30 MIN PRN
Status: DISCONTINUED | OUTPATIENT
Start: 2024-11-20 | End: 2024-11-24 | Stop reason: HOSPADM

## 2024-11-20 RX ORDER — OLANZAPINE 5 MG/1
5-10 TABLET, ORALLY DISINTEGRATING ORAL EVERY 6 HOURS PRN
Status: DISCONTINUED | OUTPATIENT
Start: 2024-11-20 | End: 2024-11-24 | Stop reason: HOSPADM

## 2024-11-20 RX ORDER — FLUMAZENIL 0.1 MG/ML
0.2 INJECTION, SOLUTION INTRAVENOUS
Status: DISCONTINUED | OUTPATIENT
Start: 2024-11-20 | End: 2024-11-24 | Stop reason: HOSPADM

## 2024-11-20 RX ADMIN — DIAZEPAM 5 MG: 5 INJECTION INTRAMUSCULAR; INTRAVENOUS at 21:41

## 2024-11-20 RX ADMIN — DIAZEPAM 10 MG: 5 INJECTION INTRAMUSCULAR; INTRAVENOUS at 23:41

## 2024-11-20 RX ADMIN — FOLIC ACID 1 MG: 1 TABLET ORAL at 21:41

## 2024-11-20 RX ADMIN — Medication 1 TABLET: at 21:00

## 2024-11-20 RX ADMIN — DIAZEPAM 10 MG: 5 INJECTION INTRAMUSCULAR; INTRAVENOUS at 23:05

## 2024-11-20 RX ADMIN — DEXTROSE AND SODIUM CHLORIDE: 5; 900 INJECTION, SOLUTION INTRAVENOUS at 22:21

## 2024-11-20 RX ADMIN — THIAMINE HCL TAB 100 MG 100 MG: 100 TAB at 21:41

## 2024-11-20 RX ADMIN — GABAPENTIN 300 MG: 300 CAPSULE ORAL at 22:19

## 2024-11-20 RX ADMIN — DIAZEPAM 5 MG: 5 INJECTION INTRAMUSCULAR; INTRAVENOUS at 20:59

## 2024-11-20 RX ADMIN — HALOPERIDOL LACTATE 5 MG: 5 INJECTION, SOLUTION INTRAMUSCULAR at 23:22

## 2024-11-20 RX ADMIN — SODIUM CHLORIDE 1000 ML: 9 INJECTION, SOLUTION INTRAVENOUS at 20:48

## 2024-11-20 ASSESSMENT — LIFESTYLE VARIABLES
ORIENTATION AND CLOUDING OF SENSORIUM: DISORIENTED FOR DATE BY NO MORE THAN 2 CALENDAR DAYS
HEADACHE, FULLNESS IN HEAD: NOT PRESENT
AGITATION: NORMAL ACTIVITY
VISUAL DISTURBANCES: MILD SENSITIVITY
VISUAL DISTURBANCES: MILD SENSITIVITY
HEADACHE, FULLNESS IN HEAD: NOT PRESENT
AUDITORY DISTURBANCES: MODERATE HARSHNESS OR ABILITY TO FRIGHTEN
ANXIETY: 3
TREMOR: 3
PAROXYSMAL SWEATS: BARELY PERCEPTIBLE SWEATING, PALMS MOIST
VISUAL DISTURBANCES: VERY MILD SENSITIVITY
TACTILE DISTURBANCES: VERY MILD ITCHING, PINS AND NEEDLES, BURNING OR NUMBNESS
VISUAL DISTURBANCES: VERY MILD SENSITIVITY
PAROXYSMAL SWEATS: 3
AGITATION: NORMAL ACTIVITY
TOTAL SCORE: 15
AGITATION: NORMAL ACTIVITY
AUDITORY DISTURBANCES: MODERATE HARSHNESS OR ABILITY TO FRIGHTEN
TACTILE DISTURBANCES: MODERATELY SEVERE HALLUCINATIONS
ANXIETY: MODERATELY ANXIOUS, OR GUARDED, SO ANXIETY IS INFERRED
AGITATION: NORMAL ACTIVITY
TACTILE DISTURBANCES: MILD ITCHING, PINS AND NEEDLES, BURNING OR NUMBNESS
ORIENTATION AND CLOUDING OF SENSORIUM: DISORIENTED FOR DATE BY NO MORE THAN 2 CALENDAR DAYS
AUDITORY DISTURBANCES: SEVERE HALLUCINATIONS
ORIENTATION AND CLOUDING OF SENSORIUM: CANNOT DO SERIAL ADDITIONS OR IS UNCERTAIN ABOUT DATE
HEADACHE, FULLNESS IN HEAD: NOT PRESENT
ANXIETY: MILDLY ANXIOUS
TOTAL SCORE: 14
VISUAL DISTURBANCES: MODERATE SENSITIVITY
TACTILE DISTURBANCES: VERY MILD ITCHING, PINS AND NEEDLES, BURNING OR NUMBNESS
NAUSEA AND VOMITING: NO NAUSEA AND NO VOMITING
NAUSEA AND VOMITING: NO NAUSEA AND NO VOMITING
ORIENTATION AND CLOUDING OF SENSORIUM: CANNOT DO SERIAL ADDITIONS OR IS UNCERTAIN ABOUT DATE
PAROXYSMAL SWEATS: NO SWEAT VISIBLE
TREMOR: 3
NAUSEA AND VOMITING: NO NAUSEA AND NO VOMITING
AUDITORY DISTURBANCES: VERY MILD HARSHNESS OR ABILITY TO FRIGHTEN
HEADACHE, FULLNESS IN HEAD: NOT PRESENT
HEADACHE, FULLNESS IN HEAD: NOT PRESENT
AUDITORY DISTURBANCES: MODERATE HARSHNESS OR ABILITY TO FRIGHTEN
ANXIETY: 3
PAROXYSMAL SWEATS: BARELY PERCEPTIBLE SWEATING, PALMS MOIST
AGITATION: NORMAL ACTIVITY
TOTAL SCORE: 15
ANXIETY: 3
AGITATION: NORMAL ACTIVITY
NAUSEA AND VOMITING: NO NAUSEA AND NO VOMITING
PAROXYSMAL SWEATS: BARELY PERCEPTIBLE SWEATING, PALMS MOIST
TOTAL SCORE: 17
TREMOR: 3
TOTAL SCORE: 20
NAUSEA AND VOMITING: NO NAUSEA AND NO VOMITING
ORIENTATION AND CLOUDING OF SENSORIUM: DISORIENTED FOR DATE BY MORE THAN 2 CALENDAR DAYS
TOTAL SCORE: 6
HEADACHE, FULLNESS IN HEAD: NOT PRESENT
TREMOR: 2
ORIENTATION AND CLOUDING OF SENSORIUM: DISORIENTED FOR DATE BY NO MORE THAN 2 CALENDAR DAYS
NAUSEA AND VOMITING: NO NAUSEA AND NO VOMITING
ANXIETY: MILDLY ANXIOUS
VISUAL DISTURBANCES: MILD SENSITIVITY
PAROXYSMAL SWEATS: BARELY PERCEPTIBLE SWEATING, PALMS MOIST
AUDITORY DISTURBANCES: MILD HARSHNESS OR ABILITY TO FRIGHTEN
TREMOR: 3
TREMOR: MODERATE, WITH PATIENT'S ARMS EXTENDED

## 2024-11-20 ASSESSMENT — ACTIVITIES OF DAILY LIVING (ADL)
ADLS_ACUITY_SCORE: 0

## 2024-11-21 VITALS
TEMPERATURE: 98.9 F | WEIGHT: 200 LBS | DIASTOLIC BLOOD PRESSURE: 95 MMHG | OXYGEN SATURATION: 98 % | SYSTOLIC BLOOD PRESSURE: 138 MMHG | BODY MASS INDEX: 31.39 KG/M2 | HEIGHT: 67 IN | HEART RATE: 83 BPM | RESPIRATION RATE: 20 BRPM

## 2024-11-21 LAB
ALBUMIN SERPL BCG-MCNC: 3.9 G/DL (ref 3.5–5.2)
ALP SERPL-CCNC: 194 U/L (ref 40–150)
ALT SERPL W P-5'-P-CCNC: 70 U/L (ref 0–70)
ANION GAP SERPL CALCULATED.3IONS-SCNC: 12 MMOL/L (ref 7–15)
AST SERPL W P-5'-P-CCNC: 72 U/L (ref 0–45)
BILIRUB DIRECT SERPL-MCNC: 0.28 MG/DL (ref 0–0.3)
BILIRUB SERPL-MCNC: 1.1 MG/DL
BUN SERPL-MCNC: 12.8 MG/DL (ref 6–20)
CALCIUM SERPL-MCNC: 9.1 MG/DL (ref 8.8–10.4)
CHLORIDE SERPL-SCNC: 96 MMOL/L (ref 98–107)
CREAT SERPL-MCNC: 0.87 MG/DL (ref 0.67–1.17)
EGFRCR SERPLBLD CKD-EPI 2021: >90 ML/MIN/1.73M2
ERYTHROCYTE [DISTWIDTH] IN BLOOD BY AUTOMATED COUNT: 13.9 % (ref 10–15)
GLUCOSE BLDC GLUCOMTR-MCNC: 112 MG/DL (ref 70–99)
GLUCOSE SERPL-MCNC: 93 MG/DL (ref 70–99)
HCO3 SERPL-SCNC: 26 MMOL/L (ref 22–29)
HCT VFR BLD AUTO: 35 % (ref 40–53)
HGB BLD-MCNC: 11.7 G/DL (ref 13.3–17.7)
MAGNESIUM SERPL-MCNC: 2.5 MG/DL (ref 1.7–2.3)
MCH RBC QN AUTO: 33.7 PG (ref 26.5–33)
MCHC RBC AUTO-ENTMCNC: 33.4 G/DL (ref 31.5–36.5)
MCV RBC AUTO: 101 FL (ref 78–100)
PHOSPHATE SERPL-MCNC: 2.1 MG/DL (ref 2.5–4.5)
PLATELET # BLD AUTO: 104 10E3/UL (ref 150–450)
POTASSIUM SERPL-SCNC: 4 MMOL/L (ref 3.4–5.3)
PROT SERPL-MCNC: 6.4 G/DL (ref 6.4–8.3)
RBC # BLD AUTO: 3.47 10E6/UL (ref 4.4–5.9)
SODIUM SERPL-SCNC: 134 MMOL/L (ref 135–145)
WBC # BLD AUTO: 6.6 10E3/UL (ref 4–11)

## 2024-11-21 PROCEDURE — 99207 PR APP CREDIT; MD BILLING SHARED VISIT: CPT

## 2024-11-21 PROCEDURE — 250N000013 HC RX MED GY IP 250 OP 250 PS 637: Performed by: INTERNAL MEDICINE

## 2024-11-21 PROCEDURE — 250N000012 HC RX MED GY IP 250 OP 636 PS 637: Performed by: STUDENT IN AN ORGANIZED HEALTH CARE EDUCATION/TRAINING PROGRAM

## 2024-11-21 PROCEDURE — 85048 AUTOMATED LEUKOCYTE COUNT: CPT | Performed by: STUDENT IN AN ORGANIZED HEALTH CARE EDUCATION/TRAINING PROGRAM

## 2024-11-21 PROCEDURE — 80048 BASIC METABOLIC PNL TOTAL CA: CPT | Performed by: STUDENT IN AN ORGANIZED HEALTH CARE EDUCATION/TRAINING PROGRAM

## 2024-11-21 PROCEDURE — 999N000128 HC STATISTIC PERIPHERAL IV START W/O US GUIDANCE

## 2024-11-21 PROCEDURE — 250N000013 HC RX MED GY IP 250 OP 250 PS 637: Performed by: STUDENT IN AN ORGANIZED HEALTH CARE EDUCATION/TRAINING PROGRAM

## 2024-11-21 PROCEDURE — 82565 ASSAY OF CREATININE: CPT | Performed by: STUDENT IN AN ORGANIZED HEALTH CARE EDUCATION/TRAINING PROGRAM

## 2024-11-21 PROCEDURE — 258N000003 HC RX IP 258 OP 636: Performed by: STUDENT IN AN ORGANIZED HEALTH CARE EDUCATION/TRAINING PROGRAM

## 2024-11-21 PROCEDURE — 80053 COMPREHEN METABOLIC PANEL: CPT | Performed by: STUDENT IN AN ORGANIZED HEALTH CARE EDUCATION/TRAINING PROGRAM

## 2024-11-21 PROCEDURE — 85018 HEMOGLOBIN: CPT | Performed by: STUDENT IN AN ORGANIZED HEALTH CARE EDUCATION/TRAINING PROGRAM

## 2024-11-21 PROCEDURE — 120N000001 HC R&B MED SURG/OB

## 2024-11-21 PROCEDURE — 250N000011 HC RX IP 250 OP 636: Performed by: STUDENT IN AN ORGANIZED HEALTH CARE EDUCATION/TRAINING PROGRAM

## 2024-11-21 PROCEDURE — 250N000009 HC RX 250: Performed by: STUDENT IN AN ORGANIZED HEALTH CARE EDUCATION/TRAINING PROGRAM

## 2024-11-21 PROCEDURE — 250N000013 HC RX MED GY IP 250 OP 250 PS 637: Performed by: HOSPITALIST

## 2024-11-21 PROCEDURE — 83735 ASSAY OF MAGNESIUM: CPT | Performed by: STUDENT IN AN ORGANIZED HEALTH CARE EDUCATION/TRAINING PROGRAM

## 2024-11-21 PROCEDURE — 36415 COLL VENOUS BLD VENIPUNCTURE: CPT | Performed by: STUDENT IN AN ORGANIZED HEALTH CARE EDUCATION/TRAINING PROGRAM

## 2024-11-21 PROCEDURE — 84100 ASSAY OF PHOSPHORUS: CPT | Performed by: STUDENT IN AN ORGANIZED HEALTH CARE EDUCATION/TRAINING PROGRAM

## 2024-11-21 PROCEDURE — 99233 SBSQ HOSP IP/OBS HIGH 50: CPT | Performed by: HOSPITALIST

## 2024-11-21 PROCEDURE — 84155 ASSAY OF PROTEIN SERUM: CPT | Performed by: STUDENT IN AN ORGANIZED HEALTH CARE EDUCATION/TRAINING PROGRAM

## 2024-11-21 PROCEDURE — 82247 BILIRUBIN TOTAL: CPT | Performed by: STUDENT IN AN ORGANIZED HEALTH CARE EDUCATION/TRAINING PROGRAM

## 2024-11-21 RX ORDER — GABAPENTIN 300 MG/1
600 CAPSULE ORAL EVERY 8 HOURS
Status: DISCONTINUED | OUTPATIENT
Start: 2024-11-24 | End: 2024-11-24 | Stop reason: HOSPADM

## 2024-11-21 RX ORDER — POLYETHYLENE GLYCOL 3350 17 G
2 POWDER IN PACKET (EA) ORAL
Status: DISCONTINUED | OUTPATIENT
Start: 2024-11-21 | End: 2024-11-24 | Stop reason: HOSPADM

## 2024-11-21 RX ORDER — BUPRENORPHINE AND NALOXONE 8; 2 MG/1; MG/1
2 FILM, SOLUBLE BUCCAL; SUBLINGUAL
Status: DISCONTINUED | OUTPATIENT
Start: 2024-11-21 | End: 2024-11-22

## 2024-11-21 RX ORDER — GABAPENTIN 600 MG/1
1200 TABLET ORAL ONCE
Status: COMPLETED | OUTPATIENT
Start: 2024-11-21 | End: 2024-11-21

## 2024-11-21 RX ORDER — ALBUTEROL SULFATE 90 UG/1
2 INHALANT RESPIRATORY (INHALATION) EVERY 6 HOURS PRN
Status: DISCONTINUED | OUTPATIENT
Start: 2024-11-21 | End: 2024-11-24 | Stop reason: HOSPADM

## 2024-11-21 RX ORDER — AMOXICILLIN 250 MG
1 CAPSULE ORAL 2 TIMES DAILY PRN
Status: DISCONTINUED | OUTPATIENT
Start: 2024-11-21 | End: 2024-11-24 | Stop reason: HOSPADM

## 2024-11-21 RX ORDER — GABAPENTIN 300 MG/1
300 CAPSULE ORAL EVERY 8 HOURS
Status: DISCONTINUED | OUTPATIENT
Start: 2024-11-26 | End: 2024-11-24 | Stop reason: HOSPADM

## 2024-11-21 RX ORDER — GABAPENTIN 300 MG/1
900 CAPSULE ORAL EVERY 8 HOURS
Status: COMPLETED | OUTPATIENT
Start: 2024-11-21 | End: 2024-11-24

## 2024-11-21 RX ORDER — CALCIUM CARBONATE 500 MG/1
1000 TABLET, CHEWABLE ORAL 4 TIMES DAILY PRN
Status: DISCONTINUED | OUTPATIENT
Start: 2024-11-21 | End: 2024-11-24 | Stop reason: HOSPADM

## 2024-11-21 RX ORDER — GABAPENTIN 100 MG/1
100 CAPSULE ORAL EVERY 8 HOURS
Status: DISCONTINUED | OUTPATIENT
Start: 2024-11-28 | End: 2024-11-24 | Stop reason: HOSPADM

## 2024-11-21 RX ORDER — ACETAMINOPHEN 650 MG/1
650 SUPPOSITORY RECTAL EVERY 4 HOURS PRN
Status: DISCONTINUED | OUTPATIENT
Start: 2024-11-21 | End: 2024-11-24 | Stop reason: HOSPADM

## 2024-11-21 RX ORDER — DEXTROSE MONOHYDRATE AND SODIUM CHLORIDE 5; .9 G/100ML; G/100ML
INJECTION, SOLUTION INTRAVENOUS CONTINUOUS
Status: ACTIVE | OUTPATIENT
Start: 2024-11-21 | End: 2024-11-21

## 2024-11-21 RX ORDER — ACETAMINOPHEN 325 MG/1
650 TABLET ORAL EVERY 4 HOURS PRN
Status: DISCONTINUED | OUTPATIENT
Start: 2024-11-21 | End: 2024-11-24 | Stop reason: HOSPADM

## 2024-11-21 RX ORDER — LIDOCAINE 40 MG/G
CREAM TOPICAL
Status: DISCONTINUED | OUTPATIENT
Start: 2024-11-21 | End: 2024-11-24 | Stop reason: HOSPADM

## 2024-11-21 RX ORDER — TRAZODONE HYDROCHLORIDE 50 MG/1
50 TABLET, FILM COATED ORAL AT BEDTIME
Status: COMPLETED | OUTPATIENT
Start: 2024-11-21 | End: 2024-11-21

## 2024-11-21 RX ORDER — AMOXICILLIN 250 MG
2 CAPSULE ORAL 2 TIMES DAILY PRN
Status: DISCONTINUED | OUTPATIENT
Start: 2024-11-21 | End: 2024-11-24 | Stop reason: HOSPADM

## 2024-11-21 RX ADMIN — TRAZODONE HYDROCHLORIDE 50 MG: 50 TABLET ORAL at 23:19

## 2024-11-21 RX ADMIN — SODIUM PHOSPHATE, MONOBASIC, MONOHYDRATE AND SODIUM PHOSPHATE, DIBASIC, ANHYDROUS 15 MMOL: 142; 276 INJECTION, SOLUTION INTRAVENOUS at 15:35

## 2024-11-21 RX ADMIN — BUPRENORPHINE AND NALOXONE 2 FILM: 8; 2 FILM BUCCAL; SUBLINGUAL at 09:08

## 2024-11-21 RX ADMIN — DIAZEPAM 10 MG: 5 TABLET ORAL at 23:19

## 2024-11-21 RX ADMIN — GABAPENTIN 1200 MG: 600 TABLET, FILM COATED ORAL at 10:27

## 2024-11-21 RX ADMIN — THIAMINE HYDROCHLORIDE 500 MG: 100 INJECTION, SOLUTION INTRAMUSCULAR; INTRAVENOUS at 21:27

## 2024-11-21 RX ADMIN — GABAPENTIN 900 MG: 300 CAPSULE ORAL at 18:46

## 2024-11-21 RX ADMIN — DIAZEPAM 10 MG: 5 INJECTION INTRAMUSCULAR; INTRAVENOUS at 01:11

## 2024-11-21 RX ADMIN — DIAZEPAM 10 MG: 5 TABLET ORAL at 06:58

## 2024-11-21 RX ADMIN — DIAZEPAM 10 MG: 5 TABLET ORAL at 02:14

## 2024-11-21 RX ADMIN — FOLIC ACID 1 MG: 1 TABLET ORAL at 09:08

## 2024-11-21 RX ADMIN — THIAMINE HYDROCHLORIDE 500 MG: 100 INJECTION, SOLUTION INTRAMUSCULAR; INTRAVENOUS at 09:18

## 2024-11-21 RX ADMIN — GABAPENTIN 300 MG: 300 CAPSULE ORAL at 05:26

## 2024-11-21 RX ADMIN — THIAMINE HYDROCHLORIDE 500 MG: 100 INJECTION, SOLUTION INTRAMUSCULAR; INTRAVENOUS at 15:32

## 2024-11-21 ASSESSMENT — ACTIVITIES OF DAILY LIVING (ADL)
ADLS_ACUITY_SCORE: 0
ADLS_ACUITY_SCORE: 17.75
ADLS_ACUITY_SCORE: 0
ADLS_ACUITY_SCORE: 17.75
ADLS_ACUITY_SCORE: 17.75
ADLS_ACUITY_SCORE: 0
ADLS_ACUITY_SCORE: 17.75
ADLS_ACUITY_SCORE: 0
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
## 2024-11-21 NOTE — PROVIDER NOTIFICATION
Notified Dr. Aviles that patient is scoring high on CIWA scale. Patient has pulled 3 IV's overnight. Code 21 called overnight and patient was just trying to leave again. Requested order for a 1:1 sit.
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
## 2024-11-21 NOTE — PROVIDER NOTIFICATION
Date/Time 11/21/24 [id number]  Diagnosis:Acute Alcohol withdrawal & Hallucination  POD#:NA  Mental Status:A&OX3, disoriented to times.   Activity/dangle; SBA  Diet:Reg  Pain:Denies   Gardner/Voiding:BR  Tele/Restraints/Iso:Tele NSR  02/LDA:RA, IV Infusing   D/C Date:TBD  Other Info:VSS on RA. SKin Ok. CIWA 2&3 , noticed tremor . New Iv on Right arm. On K, Mg & Phosphorus  protocol. Phosphorus Replaced recheck Am. Sitter by bedside. Slept most of the day .

## 2024-11-21 NOTE — PROGRESS NOTES
[facility]    Hospitalist Progress Note    Date of Admission:  11/20/2024    Assessment & Plan     Adrian iYn is a 49 year old male with history of ankylosing spondylitis, OUD, AUD who was admitted on 11/20/2024 with acute alcohol withdrawal.     Acute alcohol withdrawal  Severe alcohol use disorder  Alcoholic hallucinosis  Hypophosphatemia, hyponatremia  Elevated LFTs  Patient presenting with paranoia, visual hallucinations, tongue fasciculation, upper extremity tremor consistent with acute alcohol withdrawal in setting of longstanding alcohol use.  No hemodynamic or autonomic instability noted, and no concern for acute delirium tremens at this time.  Hallucinations consistent with alcoholic hallucinosis.  Denies any history of alcohol withdrawal or withdrawal seizure.  Symptoms improved with IV Valium.  Electrolyte derangements notable and in setting of severe alcohol use with poor other p.o. intake.  Denies RUQ or epigastric pain and suspect LFT elevation in setting of alcohol use.  -CIWA w/ PO and IV valium  -High-dose thiamine, folate  -Start D5-LR 75ml/hr overnight              +Starting alongside thiamine and folate supplementation  -Gabapentin taper ordered  -K, mag replacement per protocol  -Cardiac monitoring  -ChemDep consult   -Careful monitoring of any autonomic or hemodynamic instability  -LFTs improving    Thrombocytopenia due to alcohol use  Stable in low 100s. No evidence of bleeding.    Hx of OUD - Continue PTA suboxone 8-2  Hx of ankylosing spondylitis - Due for PTA humira 11/21- hold.     Tobacco use disorder  -PRN nicotine lozenges per pt request. Smoke a pack/ day     Diet: Regular diet  DVT Prophylaxis: Pneumatic Compression Devices and Ambulate every shift  Code Status: Full code      Medical Decision Making       Please see A&P for additional details of medical decision making.        Clinically Significant Risk Factors Present on Admission         #  "Hyponatremia: Lowest Na = 133 mmol/L in last 2 days, will monitor as appropriate  # Hypochloremia: Lowest Cl = 91 mmol/L in last 2 days, will monitor as appropriate        # Thrombocytopenia: Lowest platelets = 104 in last 2 days, will monitor for bleeding             # Obesity: Estimated body mass index is 31.32 kg/m  as calculated from the following:    Height as of this encounter: 1.702 m (5' 7\").    Weight as of this encounter: 90.7 kg (200 lb).                Meena Aviles MD  Text Page (7am - 6pm, M-F)  [facility]  Securely message with the [employer] Web Console (learn more here)  Text page via [employer] Paging/Directory      Interval History   Noted RRT overnight as patient was increasingly confused, restless and wanting to leave.  However was redirectable.  This morning also apparently was asking to leave and was restless-bedside sitter was started.  When seen later in the morning, patient was calmly laying in bed.  Noted to be tremulous.  However was able to engage in good conversation.  States he is feeling better.  Last drink was 11/17 around 3 PM.  Was drinking?  Half a bottle of vodka daily before that.  Tells me that he is motivated to get help and wants to get sober.    -Data reviewed today: I reviewed all new labs and imaging results over the last 24 hours. I personally reviewed EKG with result as noted above    Physical Exam   Temp: 98.6  F (37  C) Temp src: Oral BP: 130/71 Pulse: 81   Resp: 18 SpO2: 96 % O2 Device: None (Room air)    Vitals:    11/21/24 0600   Weight: 90.7 kg (200 lb)     Vital Signs with Ranges  Temp:  [98.6  F (37  C)-99.7  F (37.6  C)] 98.6  F (37  C)  Pulse:  [] 81  Resp:  [14-32] 18  BP: (113-162)/(64-96) 130/71  SpO2:  [96 %-99 %] 96 %  No intake/output data recorded.    Constitutional: Drowsy and falls asleep intermittently but easily arousable, is tremulous, appears somewhat flushed, not in any distress  Respiratory: Non labored breathing, clear " to auscultation bilaterally,  Cardiovascular: Heart sounds regular rate and rhythm, no murmurs, no leg edema  GI: Abdomen is soft, non distended, non tender. Normal BS  Neuro: Sleepy but arousable, converses appropriately, moving all extremities, fluent speech, no facial asymmetry       Medications   Current Facility-Administered Medications   Medication Dose Route Frequency Provider Last Rate Last Admin    dextrose 5% and 0.9% NaCl infusion   Intravenous Continuous Clarence Garcias MD 75 mL/hr at 11/21/24 0019 Rate Verify at 11/21/24 0019     Current Facility-Administered Medications   Medication Dose Route Frequency Provider Last Rate Last Admin    buprenorphine HCl-naloxone HCl (SUBOXONE) 8-2 MG per film 2 Film  2 Film Sublingual Once per day on Monday Thursday Clarence Garcias MD   2 Film at 11/21/24 0908    folic acid (FOLVITE) tablet 1 mg  1 mg Oral Daily Clarence Garcias MD   1 mg at 11/21/24 0908    gabapentin (NEURONTIN) capsule 300 mg  300 mg Oral Q8H Clarence Garcias MD   300 mg at 11/21/24 0526    sodium chloride (PF) 0.9% PF flush 3 mL  3 mL Intracatheter Q8H Clarence Garcias MD   3 mL at 11/21/24 0025    thiamine (B-1) injection 500 mg  500 mg Intravenous TID Clarence Garcias MD   500 mg at 11/21/24 0918    Followed by    [START ON 11/23/2024] thiamine (B-1) injection 250 mg  250 mg Intravenous Daily Clarence Garcias MD        Followed by    [START ON 11/28/2024] thiamine (B-1) tablet 100 mg  100 mg Oral Daily Clarence Garcias MD           Data   Recent Labs   Lab 11/21/24 0903 11/20/24 2054   WBC 6.6 10.4   HGB 11.7* 13.8   * 96   * 115*   NA  --  133*   POTASSIUM  --  3.6   CHLORIDE  --  91*   CO2  --  25   BUN  --  9.3   CR  --  0.80   ANIONGAP  --  17*   YANDY  --  9.7   GLC  --  90   ALBUMIN  --  4.6   PROTTOTAL  --  7.4   BILITOTAL  --  1.6*   ALKPHOS  --  238*   ALT  --  87*   AST  --  92*       Imaging  No results found for this or any previous visit (from the past 24  hours).

## 2024-11-21 NOTE — ED NOTES
Bed: ED16  Expected date:   Expected time:   Means of arrival:   Comments:  HEMS 451 47M etoh withdrawal, hallucinations, hold eta 2016

## 2024-11-21 NOTE — PROGRESS NOTES
RECEIVING UNIT ED HANDOFF REVIEW    ED Nurse Handoff Report was reviewed by: Rg Buck RN on November 20, 2024 at 11:36 PM

## 2024-11-21 NOTE — PLAN OF CARE
Goal Outcome Evaluation:        Pt alert to self, VSS on room air. Scoring high on CIWA scale, protocol followed. Pt pulled out multiple IVs. Tries to leave multiple time, easily redirectable, door alarm placed. Continue with plan of care.

## 2024-11-21 NOTE — CODE/RAPID RESPONSE
[facility]    RRT Note:  CODE 21/Restraint check  11/21/2024   Time Called: 0054    RRT called for: Code 21     Assessment & Plan   Adrian Yin is a 49 year old male with history of ankylosing spondylitis, OUD, AUD who was admitted on 11/20/2024 with acute alcohol withdrawal. PMH significant for opioid use disorder, chronic pain, and ankylosing spondylitis.    Acute alcohol withdrawal with delirium  Patient continues to be diaphoretic, tremulous, and having hallucinations and paranoia. Patient became very agitated and telling staff that he was going to leave so CODE 21 was activated. Patient was sitting in the chair when I arrived and stated that he wanted to go home. I discussed that he was actively withdrawing from alcohol so he would not be able to leave until those symptoms resolved as he would not be able to safely make decisions involving his medical care during withdrawals. I discussed the care for alcohol withdrawal being administration of benzodiazepines which he has had a few times already in the ED. Patient had removed his IV so I told him that we would place a new one to administer the medications. Patient was agreeable and he apologized  to his RN for being upset. Patient was easily redirectable when given the reasons for what we were doing. New IV placed by ICU RN and diazepam 10mg IV was given.     INTERVENTIONS:  - New PIV placed  - Diazepam given per [facility] protocol, reassess in 30 minutes.    Discussed with and defer further cares to bedside RN.    Code Status: Full Code    JERRY Rodriguez CNP    Allergies   No Known Allergies    Physical Exam   Vital Signs with Ranges:  Temp:  [98.6  F (37  C)-99.7  F (37.6  C)] 98.6  F (37  C)  Pulse:  [] 99  Resp:  [14-32] 19  BP: (113-162)/(64-96) 113/64  SpO2:  [96 %-99 %] 96 %  No intake/output data recorded.        Data     IMAGING: (X-ray/CT/MRI)   No results found for this or any previous visit (from the past 24  "hours).    CBC with Diff:  Recent Labs   Lab Test 11/20/24 2054   WBC 10.4   HGB 13.8   MCV 96   *      No results found for: \"RETICABSCT\"  No results found for: \"RETP\"    Lactic Acid:    No results found for: \"LACT\"        Comprehensive Metabolic Panel:  Recent Labs   Lab 11/20/24 2054   *   POTASSIUM 3.6   CHLORIDE 91*   CO2 25   ANIONGAP 17*   GLC 90   BUN 9.3   CR 0.80   GFRESTIMATED >90   YANDY 9.7   MAG 2.5*   PHOS 1.8*   PROTTOTAL 7.4   ALBUMIN 4.6   BILITOTAL 1.6*   ALKPHOS 238*   AST 92*   ALT 87*       UA:  No results for input(s): \"COLOR\", \"APPEARANCE\", \"URINEGLC\", \"URINEBILI\", \"URINEKETONE\", \"SG\", \"UBLD\", \"URINEPH\", \"PROTEIN\", \"UROBILINOGEN\", \"NITRITE\", \"LEUKEST\", \"RBCU\", \"WBCU\" in the last 168 hours.      Time Spent on this Encounter   I spent 30 minutes on the unit/floor managing the care of Adrian Yin. Over 50% of my time was spent counseling the patient and/or coordinating care regarding services listed in this note.      "

## 2024-11-21 NOTE — PHARMACY-ADMISSION MEDICATION HISTORY
Pharmacist Admission Medication History    Admission medication history is complete. The information provided in this note is only as accurate as the sources available at the time of the update.    Information Source(s): Patient and CareEverywhere/SureScripts via in-person    Pertinent Information:  - Recent prescription of alprazolam 1 mg tablets; take 1 tablet three times daily as needed for panic attacks; patient no longer has any medication left or active prescription so not added at this time    Changes made to PTA medication list:  Added: humira, albuterol  Deleted: meloxicam, tizanidine  Changed: added suboxone directions    Allergies reviewed with patient and updates made in EHR: no    Medication History Completed By: Oswaldo Colmenares RPH 11/20/2024 9:55 PM    PTA Med List   Medication Sig Last Dose/Taking    adalimumab (HUMIRA) 40 MG/0.8ML prefilled syringe kit Inject 40 mg subcutaneously every 14 days. 11/7/2024    albuterol (PROAIR HFA/PROVENTIL HFA/VENTOLIN HFA) 108 (90 Base) MCG/ACT inhaler Inhale 2 puffs into the lungs every 6 hours as needed for shortness of breath, wheezing or cough. Taking As Needed    SUBOXONE 8-2 MG per film Place 2 Film under the tongue twice a week. Takes on Thursday and then when next needed, usually Sat, Sun, or Mon Past Week
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
## 2024-11-21 NOTE — CONSULTS
11/21/2024  ADALID Consult acknowledged. Pt appears to be in active etoh withdrawal.  Staff will attempt to see pt for ADALID Consult Friday 11/22/24.  If pt discharges prior to consult, they can call [facility] at [phone] to schedule an OP ADALID Assessment.    Amira Monzon MA [facility]  ADALID Evaluation Counselor  [phone]  [email or web address]

## 2024-11-21 NOTE — ED NOTES
Pt pleasant and cooperative during CIWA and medication administration. Pt endorses positive thoughts of getting to sleep tonight and is grateful he came to the hospital for help with withdrawal.

## 2024-11-22 LAB
ANION GAP SERPL CALCULATED.3IONS-SCNC: 11 MMOL/L (ref 7–15)
BUN SERPL-MCNC: 8.2 MG/DL (ref 6–20)
CALCIUM SERPL-MCNC: 9.2 MG/DL (ref 8.8–10.4)
CHLORIDE SERPL-SCNC: 99 MMOL/L (ref 98–107)
CREAT SERPL-MCNC: 0.91 MG/DL (ref 0.67–1.17)
EGFRCR SERPLBLD CKD-EPI 2021: >90 ML/MIN/1.73M2
GLUCOSE SERPL-MCNC: 121 MG/DL (ref 70–99)
HCO3 SERPL-SCNC: 27 MMOL/L (ref 22–29)
MAGNESIUM SERPL-MCNC: 2.3 MG/DL (ref 1.7–2.3)
PHOSPHATE SERPL-MCNC: 4 MG/DL (ref 2.5–4.5)
POTASSIUM SERPL-SCNC: 4 MMOL/L (ref 3.4–5.3)
SODIUM SERPL-SCNC: 137 MMOL/L (ref 135–145)

## 2024-11-22 PROCEDURE — 82310 ASSAY OF CALCIUM: CPT | Performed by: HOSPITALIST

## 2024-11-22 PROCEDURE — 83735 ASSAY OF MAGNESIUM: CPT | Performed by: HOSPITALIST

## 2024-11-22 PROCEDURE — 250N000011 HC RX IP 250 OP 636: Performed by: INTERNAL MEDICINE

## 2024-11-22 PROCEDURE — 99232 SBSQ HOSP IP/OBS MODERATE 35: CPT | Performed by: INTERNAL MEDICINE

## 2024-11-22 PROCEDURE — 36415 COLL VENOUS BLD VENIPUNCTURE: CPT | Performed by: HOSPITALIST

## 2024-11-22 PROCEDURE — 250N000013 HC RX MED GY IP 250 OP 250 PS 637: Performed by: INTERNAL MEDICINE

## 2024-11-22 PROCEDURE — 80048 BASIC METABOLIC PNL TOTAL CA: CPT | Performed by: HOSPITALIST

## 2024-11-22 PROCEDURE — 84100 ASSAY OF PHOSPHORUS: CPT | Performed by: STUDENT IN AN ORGANIZED HEALTH CARE EDUCATION/TRAINING PROGRAM

## 2024-11-22 PROCEDURE — 250N000012 HC RX MED GY IP 250 OP 636 PS 637: Performed by: INTERNAL MEDICINE

## 2024-11-22 PROCEDURE — 250N000013 HC RX MED GY IP 250 OP 250 PS 637: Performed by: STUDENT IN AN ORGANIZED HEALTH CARE EDUCATION/TRAINING PROGRAM

## 2024-11-22 PROCEDURE — 82565 ASSAY OF CREATININE: CPT | Performed by: HOSPITALIST

## 2024-11-22 PROCEDURE — 250N000012 HC RX MED GY IP 250 OP 636 PS 637: Performed by: STUDENT IN AN ORGANIZED HEALTH CARE EDUCATION/TRAINING PROGRAM

## 2024-11-22 PROCEDURE — 250N000013 HC RX MED GY IP 250 OP 250 PS 637: Performed by: HOSPITALIST

## 2024-11-22 PROCEDURE — 120N000001 HC R&B MED SURG/OB

## 2024-11-22 RX ORDER — BUPRENORPHINE AND NALOXONE 8; 2 MG/1; MG/1
1 FILM, SOLUBLE BUCCAL; SUBLINGUAL
Status: DISCONTINUED | OUTPATIENT
Start: 2024-11-22 | End: 2024-11-24 | Stop reason: HOSPADM

## 2024-11-22 RX ORDER — BUPRENORPHINE AND NALOXONE 8; 2 MG/1; MG/1
2 FILM, SOLUBLE BUCCAL; SUBLINGUAL
Status: DISCONTINUED | OUTPATIENT
Start: 2024-11-22 | End: 2024-11-22

## 2024-11-22 RX ADMIN — DIAZEPAM 10 MG: 5 TABLET ORAL at 21:05

## 2024-11-22 RX ADMIN — FOLIC ACID 1 MG: 1 TABLET ORAL at 10:08

## 2024-11-22 RX ADMIN — DIAZEPAM 10 MG: 5 TABLET ORAL at 17:52

## 2024-11-22 RX ADMIN — DIAZEPAM 10 MG: 5 TABLET ORAL at 10:07

## 2024-11-22 RX ADMIN — BUPRENORPHINE AND NALOXONE 1 FILM: 8; 2 FILM, SOLUBLE BUCCAL; SUBLINGUAL at 17:57

## 2024-11-22 RX ADMIN — THIAMINE HYDROCHLORIDE 500 MG: 100 INJECTION, SOLUTION INTRAMUSCULAR; INTRAVENOUS at 14:14

## 2024-11-22 RX ADMIN — DIAZEPAM 5 MG: 5 INJECTION INTRAMUSCULAR; INTRAVENOUS at 14:47

## 2024-11-22 RX ADMIN — GABAPENTIN 900 MG: 300 CAPSULE ORAL at 17:52

## 2024-11-22 RX ADMIN — BUPRENORPHINE AND NALOXONE 1 FILM: 8; 2 FILM, SOLUBLE BUCCAL; SUBLINGUAL at 14:22

## 2024-11-22 RX ADMIN — THIAMINE HYDROCHLORIDE 500 MG: 100 INJECTION, SOLUTION INTRAMUSCULAR; INTRAVENOUS at 21:06

## 2024-11-22 RX ADMIN — GABAPENTIN 900 MG: 300 CAPSULE ORAL at 10:07

## 2024-11-22 RX ADMIN — GABAPENTIN 900 MG: 300 CAPSULE ORAL at 02:00

## 2024-11-22 ASSESSMENT — ACTIVITIES OF DAILY LIVING (ADL)
ADLS_ACUITY_SCORE: 0
ADLS_ACUITY_SCORE: 17.75
ADLS_ACUITY_SCORE: 0
ADLS_ACUITY_SCORE: 0
ADLS_ACUITY_SCORE: 17.75
ADLS_ACUITY_SCORE: 17.75
ADLS_ACUITY_SCORE: 0
ADLS_ACUITY_SCORE: 0
ADLS_ACUITY_SCORE: 17.75
ADLS_ACUITY_SCORE: 17.75
ADLS_ACUITY_SCORE: 0
ADLS_ACUITY_SCORE: 17.75
ADLS_ACUITY_SCORE: 0
ADLS_ACUITY_SCORE: 17.75
ADLS_ACUITY_SCORE: 0
ADLS_ACUITY_SCORE: 0
ADLS_ACUITY_SCORE: 17.75
ADLS_ACUITY_SCORE: 0

## 2024-11-22 NOTE — PHARMACY-ADMISSION MEDICATION HISTORY
Pharmacist Admission Medication History    Admission medication history is complete. The information provided in this note is only as accurate as the sources available at the time of the update.    Information Source(s): Patient and CareEverywhere/SureScripts via in-person    Pertinent Information: Re-interviewed patient per RN request. Made the following changes:  Patient reports to me he uses Suboxone film differently than originally reported - uses 1 film two times daily.  He also indicated he uses testosterone 50 mg twice weekly, though [facility] records indicate he should be using 50 mg weekly (also supported by SureScripts fill quantity of 10 mL for 28 days).  Added magnesium and multivitamin  He was prescribed anastrozole and a prednisone taper 11/3/24 - he never started anastrozole and completed the prednisone course.    Allergies reviewed with patient and updates made in EHR: no    Medication History Completed By: Rachele Moe [facility] 11/21/2024 9:18 PM    PTA Med List   Medication Sig Note Last Dose/Taking    adalimumab (HUMIRA) 40 MG/0.8ML prefilled syringe kit Inject 40 mg subcutaneously every 14 days.  11/7/2024    albuterol (PROAIR HFA/PROVENTIL HFA/VENTOLIN HFA) 108 (90 Base) MCG/ACT inhaler Inhale 2 puffs into the lungs every 6 hours as needed for shortness of breath, wheezing or cough.  Taking As Needed    MAGNESIUM PO Take 1 tablet by mouth daily.  Past Week    multivitamin CF FORMULA (CHOICEFUL) chewable tablet Take 1 tablet by mouth daily.  Past Week    SUBOXONE 8-2 MG per film Place 1 Film under the tongue 2 times daily. Takes 1 film in the morning and 1 film at lunch 11/21/2024: Unsure if last dose 11/20 am or 11/19. Past Week    testosterone cypionate (DEPOTESTOSTERONE) 200 MG/ML injection Inject 50 mg into the muscle twice a week. 11/21/2024: Of note, [facility] records indicate prescription for 50 mg once weekly. Past Week

## 2024-11-22 NOTE — PROGRESS NOTES
Received a phone from a family friend that wanted an update on everything that was going on and wanted to know labs. Upon asking who it was, this person reports they are *** Gio. I stated they were not listed as someone who I could give updates about and I need to ask the pt if it is okay for me to give updates. Upon

## 2024-11-22 NOTE — PROGRESS NOTES
[facility]  [address] [place] [address]      11/22/2024      Adrian Yin  [address] [place] [id number]      Dear Mr. Yin,    It was a pleasure talking with you on the phone. I have created a list of different substance use disorder (ADALID) treatment programs for you to look over. Inpatient ADALID treatment is typically 28-45 days long where you live at the treatment program and work on your new sobriety. Intensive Outpatient treatment is typically 3-4 days per week where you live at home and attend groups either during the day or in the evening. To get started with any of these treatment programs, please call them directly.   If you have questions, my phone number is [phone].    Thanks,  Patricia ESTRADA ADALID treatment:  [place]   Phone: [phone]  Efax: [phone]  email: [email or web address]  [address] [place] [address]  https://[email or web address]    Marni Bates  Phone: [phone]  Fax: [phone]  email: [email or web address].org  https://www.[facility].org/    [facility]   [address] [place] [address]  Phone: [phone]  Fax: [phone]  https://JustParts/    [facility]  [address] [place] [address]  Phone: [phone]  Fax: [phone]  Email: [email or web address]  https://SafariDesk/[facility]/    [facility]: (co-ed, 30-45 days long)  Ph: [phone]  Fax: [phone]  [address] [place] [address]  Email: [email or web address]  www.Arigotreatment.BandPage    IOP ADALID treatment:  [facility]: (in-person & virtual)  [facility],   [place] [address].  Phone: [phone]  Fax: [phone]  email:  info@[email or web address] outdoors  https://M360LOHAS outdoors/  *Virtual Day Group: (16-18 weeks long & co-occurring)  Mondays, Wednesdays, Thursdays 11:00am - 1:00pm and  Tuesdays 10:00am - 12:00pm.      Marni Bates  Phone: [phone]  Fax: [phone]  email: [email or web address]@[email or web address]  https://www.[facility]/    [facility]   Phone: [phone]  Fax: [phone]  www.Suniblecare.org    [facility] IOP:  Phone: [phone]  https://[facility].org/specialties/Substance-Use   [facility] OP Program/Harm Reduction: T,TH 12:30pm-3:30pm (Accepts Medicare)   [place] Co-occurring IOP: M,W, TH 9am-12pm (Accepts Medicare)   [place] Co-occurring IOP Morning:  M,T,W 9am-12:00pm   [place] Co-occurring IOP Evening: M,T,W 5:00pm-8:00pm (accepts Medicare)   [place] [facility] OP: ?M,T,W 12:00pm-2:00pm (Accepts Medicare)    [place] Outpatient:  Locations: [facility] [place], [facility], and [facility].  Phone: [phone]  Fax:  [phone]  Email: [email or web address]  email: [email or web address]  https://Massive/outpatient-treatment/    [facility] -  Co-Occurring IOP   [address] [address]  Phone: [phone]  Fax: [phone]  Email: admissions@Daily Interactive Networks  https://Daily Interactive Networks/programs-services/intensive-outpatient/    [facility]:  Main phone: [phone]  Direct Dial: [phone]   Admissions email: [email or web address]   ADALID fax: [phone]  www.Medesen    [facility]:  Referral Fax: [phone]  https://eSNF/  *[facility] Location : [address] [place] [address]  Phone: [phone] Fax: [phone]    The [place]: (self-pay only)  Phone: [phone]  Fax: [phone]  Email: info@[facility].[employer]  Address:  [address] [address]  https://www.[facility].Arctic Sand Technologies/    Sober Support Groups:  [place] Recovery Connection ([facility]): [facility] connects people seeking recovery to  resources that help foster and sustain long-term recovery. Whether you are seeking resources for treatment, transportation, housing, job training, education, health care or other pathways to recovery, [facility] is a great place to start. Phone: [phone]. www.minnesotaZumper.Mission Street [employer] (Great listing of all types of recovery and non-recovery related resources)      [facility] Recovery: https://www.Chilicon Power.org/ (Online meetings, support groups, resources)      Alcoholics Anonymous: 1-800-ALCOHOL  HTTP://WWW.AA.ORG/  AA Phelps (182-303-1243 or http://aaimeem.org)  AA [place]uda (721-527-8779 or www.aastpaul.org)      Narcotics Anonymous: 513.134.5350  www.naminspotdock.us.     [facility]uge Recovery: https://www.refugerecDearLocal.org/    Celebrate Recovery: https://www.PharmaCan CapitalterMocapay.IncellDx/what-we-offer/find-a-cr-meeting    [facility] through [facility]:  https://www.Notorious/next-steps/find-support/addiction-recovery/    The [facility]:  Offers a wide range of different sober support groups and a Sunday Service.  [address] [place] [address]    https://www.[facility].org/#gsc.tab=0    Amira Monzon MA [facility]  CD Evaluation Counselor  [phone]    (Emailed to pt and placed in AVS)

## 2024-11-22 NOTE — PLAN OF CARE
Goal Outcome Evaluation:  Date/Time 11/21/24 [id number]    Trauma/Ortho/Medical (Choose one) medical    Diagnosis:Acute alcohol withdrawal and Alcoholic hallucinosis  POD#:n/a  Mental Status:A&O x3-4, know he is in the hospital but doesn't know which one  CIWA: 6, 10 (pt had just call each of his family members to help about his alcoholism), 6, 6. mostly due to tremors and anxiety  Activity/dangle: SBA due to being unsteady at times  Diet: Reg, had some snacks in the last evening  Pain: denies pain  [place]ner/Voiding: Cont  Tele/Restraints/Iso: Tele-NSR  02/LDA: VSS on RA, PIV SL  D/C Date: pending - chem dep consult 11/22  Other Info:KMag, iván protocols - recheck in the AM

## 2024-11-22 NOTE — DISCHARGE INSTRUCTIONS
Dear Mr. Yin,    It was a pleasure talking with you on the phone. I have created a list of different substance use disorder (ADALID) treatment programs for you to look over. Inpatient ADALID treatment is typically 28-45 days long where you live at the treatment program and work on your new sobriety. Intensive Outpatient treatment is typically 3-4 days per week where you live at home and attend groups either during the day or in the evening. To get started with any of these treatment programs, please call them directly.   If you have questions, my phone number is [phone].    Thanks,  Patricia ESTRADA ADALID treatment:    [facility]  Phone: [phone]  Efax: [phone]  email: [email or web address]  [address] [place] [address]  https://AccelOps/    Marni Bates  Phone: [phone]  Fax: [phone]  email: [email or web address]  https://www.CaseTrekHouston.org/    [facility]  [address] [place] [address]  Phone: [phone]  Fax: [phone]  https://HealthSpring/    [facility]  [address] [place] [address]  Phone: [phone]  Fax: [phone]  Email: admissions@Meditech Solution  https://Meditech Solution/mens-residential-[place]/    [facility]: (co-ed, 30-45 days long)  Ph: [phone]  Fax: [phone]  [address] [place] [address]  Email: [email or web address]  www.T1 VisionstreatmentTVS Logistics Services     IOP ADALID treatment:    [facility]: (in-person & virtual)  [address],  [place] [address].  Phone: [phone]  Fax: [phone]  email:  info@Veggie Grill  https://Veggie Grill/  *Virtual Day Group: (16-18 weeks long & co-occurring)  Mondays, Wednesdays, Thursdays 11:00am - 1:00pm and Tuesdays 10:00am - 12:00pm.      [place]  Phone: [phone]  Fax: [phone]  email: Nancy@[facility]  https://www.[facility] CenterAirPatrol CorporationKen[facility].org/      [facility]/ [facility]  Phone: [phone]  Fax: [phone]  www.[facility].org    [facility] [place] IOP:  Phone: [phone]  https://[facility].org/specialties/Substance-Use             [place] [facility] OP Program/Harm Reduction: T,TH 12:30pm-3:30pm (Accepts Medicare)             [place] Co-occurring IOP: M,W, TH 9am-12pm (Accepts Medicare)             [place] Co-occurring IOP Morning:  M,T,W 9am-12:00pm               [place] Co-occurring IOP Evening: M,T,W 5:00pm-8:00pm (accepts Medicare)             [place] [facility] OP: ?M,T,W 12:00pm-2:00pm (Accepts Medicare)    [place] Outpatient:  Locations: [facility] [place], [facility], and [facility].  Phone: [phone]  Fax:  [phone]  Email: [email or web address]  email: [email or web address]  https://Rewarder/outpatient-treatment/    [facility] -  Co-Occurring IOP  [facility]  [address] [place] [address]  Phone: [phone]  Fax: [phone]  Email: admissions@Loan Servicing Solutions  https://Loan Servicing Solutions/programs-services/intensive-outpatient/     [facility]:  Main phone: [phone]  Direct Dial: [phone]  Admissions email: [email or web address]  ADALID fax: [phone]  www.Galazar     [facility]:  Referral Fax: [phone]  https://professionalcounsAktivito.com/  *[facility] Location : [address] [place] [address] [address] [place] [address]  Phone: [phone] Fax: [phone]     The [place]: (self-pay only)  Phone: [phone]  Fax: [phone]  Email: info@CLUDOC [facility]  Address:  [address] [address]  https://www.DiGiCo Europe.Go-Page Digital Media     Sober Support Groups:    [place] Recovery Connection ([facility]): [facility] connects people seeking recovery to resources that help foster and sustain long-term recovery. Whether you are seeking resources for treatment,  transportation, housing, job training, education, health care or other pathways to recovery, [facility] is a great place to start. Phone: [phone]. www.minnesotaJooix.Tutor Universe (Great listing of all types of recovery and non-recovery related resources)    [employer] Recovery: https://www.smartJooix.org/ (Online meetings, support groups, resources)    Alcoholics Anonymous: 1-800-ALCOHOL  HTTP://WWW.AA.ORG/  AA [place] (999-108-0761 or http://aaminneaOpsona.org)  AA [place] (348-961-4409 or www.aastpaul.org)    Narcotics Anonymous: 599.782.7093  www.naminnesota.us.    Refuge Recovery: https://www.refugerecBlue Bay Technologies.org/    Celebrate Recovery: https://www.celebraterecBlue Bay Technologies.com/what-we-offer/find-a-cr-meeting    [facility] [address] through [facility]:  https://www.Goal ZerookEditlite/next-steps/find-support/addiction-recovery/    [facility]:  Offers a wide range of different sober support groups and a Sunday Service.  [address] [place] [address]   https://www.[facility].org/#gsc.tab=0

## 2024-11-22 NOTE — PROGRESS NOTES
[facility]    Hospitalist Progress Note    Date of Admission:  11/20/2024    Assessment & Plan     Adrian Yin is a 49 year old male with history of ankylosing spondylitis, OUD, AUD who was admitted on 11/20/2024 with acute alcohol withdrawal.     Acute alcohol withdrawal  Severe alcohol use disorder  Alcoholic hallucinosis  Hypophosphatemia, hyponatremia  Elevated LFTs  Patient presenting with paranoia, visual hallucinations, tongue fasciculation, upper extremity tremor consistent with acute alcohol withdrawal in setting of longstanding alcohol use.  No hemodynamic or autonomic instability noted, and no concern for acute delirium tremens at this time.  Hallucinations consistent with alcoholic hallucinosis.  Denies any history of alcohol withdrawal or withdrawal seizure.  Symptoms improved with IV Valium.  Electrolyte derangements notable and in setting of severe alcohol use with poor other p.o. intake.  Denies RUQ or epigastric pain and suspect LFT elevation in setting of alcohol use.  CIWA w/ PO and IV valium  High-dose thiamine, folate  thiamine and folate supplementation  Gabapentin taper ordered  K, mag replacement per protocol  Cardiac monitoring  ChemDep consult requested  Careful monitoring of any autonomic or hemodynamic instability  LFTs improving    Thrombocytopenia due to alcohol use  Stable in low 100s. No evidence of bleeding.    Hx of OUD  Continue PTA suboxone 8-2    Hx of ankylosing spondylitis - Due for PTA humira 11/21- hold.     Tobacco use disorder  PRN nicotine lozenges per pt request. Smoke a pack/ day     Diet: Regular diet  DVT Prophylaxis: Pneumatic Compression Devices and Ambulate every shift  Code Status: Full code      Medical Decision Making       Please see A&P for additional details of medical decision making.        Clinically Significant Risk Factors         # Hyponatremia: Lowest Na = 133 mmol/L in last 2 days, will monitor as appropriate  #  "Hypochloremia: Lowest Cl = 91 mmol/L in last 2 days, will monitor as appropriate        # Thrombocytopenia: Lowest platelets = 104 in last 2 days, will monitor for bleeding              # Obesity: Estimated body mass index is 31.32 kg/m  as calculated from the following:    Height as of this encounter: 1.702 m (5' 7\").    Weight as of this encounter: 90.7 kg (200 lb)., PRESENT ON ADMISSION            Donna Perez MD  Text Page (7am - 6pm, M-F)  [facility]  Securely message with the Vocera Web Console (learn more here)  Text page via [employer] Paging/Directory      Interval History   \"Doin' okay.\"  Patient offers no complaints.  RN was at the bedside, patient recently scored 9 on CIWA protocol, 6 for anxiety, 3 for tremor.  He says he \"got himself off heroin\" several years ago, tried to stop drinking on his own once in the past.  He has no new respiratory complaints.    -Data reviewed today: I reviewed all new labs and imaging results over the last 24 hours. I personally reviewed EKG with result as noted above    Physical Exam   Temp: 99  F (37.2  C) Temp src: Oral BP: 124/79 Pulse: 79   Resp: 18 SpO2: 94 % O2 Device: None (Room air)    Vitals:    11/21/24 0600   Weight: 90.7 kg (200 lb)     Vital Signs with Ranges  Temp:  [98.2  F (36.8  C)-99  F (37.2  C)] 99  F (37.2  C)  Pulse:  [79-85] 79  Resp:  [16-20] 18  BP: (101-138)/(55-95) 124/79  SpO2:  [94 %-98 %] 94 %  I/O last 3 completed shifts:  In: 540 [P.O.:540]  Out: 400 [Urine:400]    Constitutional: Awake, alert, cooperative, no apparent distress  Respiratory: Clear to auscultation bilaterally, no crackles or wheezing  Cardiovascular: Regular rate and rhythm, normal S1 and S2, and no murmur noted  GI: Normal bowel sounds, soft, non-distended, non-tender  Skin/Integumen: No rash on exposed skin.  No lower extremity edema  Other: Mood is subdued     35 MINUTES SPENT BY ME on the date of service doing chart review, history, exam, " documentation & further activities per the note.        Medications   Current Facility-Administered Medications   Medication Dose Route Frequency Provider Last Rate Last Admin     Current Facility-Administered Medications   Medication Dose Route Frequency Provider Last Rate Last Admin    buprenorphine HCl-naloxone HCl (SUBOXONE) 8-2 MG per film 2 Film  2 Film Sublingual Once per day on Monday Thursday Clarence Garcias MD   2 Film at 11/21/24 0908    folic acid (FOLVITE) tablet 1 mg  1 mg Oral Daily Clarence Garcias MD   1 mg at 11/21/24 0908    [START ON 11/28/2024] gabapentin (NEURONTIN) capsule 100 mg  100 mg Oral Q8H Meena Aviles MD        [START ON 11/26/2024] gabapentin (NEURONTIN) capsule 300 mg  300 mg Oral Q8H Meena Aviles MD        [START ON 11/24/2024] gabapentin (NEURONTIN) capsule 600 mg  600 mg Oral Q8H Meena Aviles MD        gabapentin (NEURONTIN) capsule 900 mg  900 mg Oral Q8H Meena Aviles MD   900 mg at 11/22/24 0200    sodium chloride (PF) 0.9% PF flush 3 mL  3 mL Intracatheter Q8H Clarence Garcias MD   3 mL at 11/21/24 2321    thiamine (B-1) injection 500 mg  500 mg Intravenous TID Clarence Garcias MD   500 mg at 11/21/24 2127    Followed by    [START ON 11/23/2024] thiamine (B-1) injection 250 mg  250 mg Intravenous Daily Clarence Garcias MD        Followed by    [START ON 11/28/2024] thiamine (B-1) tablet 100 mg  100 mg Oral Daily Clarence Garcias MD           Data   Recent Labs   Lab 11/21/24 2057 11/21/24 0903 11/20/24 2054   WBC  --  6.6 10.4   HGB  --  11.7* 13.8   MCV  --  101* 96   PLT  --  104* 115*   NA  --  134* 133*   POTASSIUM  --  4.0 3.6   CHLORIDE  --  96* 91*   CO2  --  26 25   BUN  --  12.8 9.3   CR  --  0.87 0.80   ANIONGAP  --  12 17*   YANDY  --  9.1 9.7   * 93 90   ALBUMIN  --  3.9 4.6   PROTTOTAL  --  6.4 7.4   BILITOTAL  --  1.1 1.6*   ALKPHOS  --  194* 238*   ALT  --  70 87*   AST  --  72* 92*       Imaging  No results  found for this or any previous visit (from the past 24 hours).

## 2024-11-22 NOTE — PLAN OF CARE
Goal Outcome Evaluation:  Date/Time 11/22/24 4323-5256     Trauma/Ortho/Medical (Choose one) medical     Diagnosis:Acute alcohol withdrawal and Alcoholic hallucinosis  POD#:n/a  Mental Status: A&Ox4  CIWA: 9, 9, 7, 9 d/t anxiety & tremors; no longer hallucinating; IV & PO valium w/ good effect  Activity/dangle: IND/steady  Diet: Regular, appetite good  Pain: denies pain  Gardner/Voiding: Cont  Tele/Restraints/Iso: tele discontinued  02/LDA: VSS on RA, PIV SL  D/C Date: pending - chem dep consult today; see notes. SW also consulted  Other Info:K, Mag, phos protocols - recheck in the AM

## 2024-11-22 NOTE — CONSULTS
11/22/2024  I spoke with pt today. He wasn't sure about attending ADALID treatment or not. He was open to ADALID treatment options. I emailed him the below list of programs he can contact directly when he is ready to attend treatment.    IP ADALID treatment:  [facility]   Phone: [phone]  Efax: [phone]  email: [email or web address]  [address] [place] [address]  https://AMT ([employer])/    Marni Bates  Phone: [phone]  Fax: [phone]  email: [email or web address] Psychiatric Care HospitalMass FidelityJam[facility].ScanCafe  [email or web address]/    [facility]   [address] [place] [address]  Phone: [phone]  Fax: [phone]  [email or web address]    [facility]'s IP  [address] [place] [address]  Phone: [phone]  Fax: [phone]  Email: [email or web address]  [email or web address]://[email or web address].Us/mens-[facility]/    [facility]: (co-ed, 30-45 days long)  Ph: [phone]  Fax: [phone]  [address] [place] [address]  Email: [email or web address]  [email or web address] Fidelity    IOP ADALID treatment:  [facility]: (in-person & virtual)  [address],   [place] [address].  Phone: [phone]  Fax: [phone]  email:  [email or web address]  [email or web address]  *Virtual Day Group: (16-18 weeks long & co-occurring)  Mondays, Wednesdays, Thursdays 11:00am - 1:00pm and Tuesdays 10:00am - 12:00pm.      Marni Bates  Phone: [phone]  Fax: [phone]  email: [email or web address]  https://www.[facility]/    [facility]   Phone: [phone]  Fax: [phone]  www.OsComp SystemsLicking Memorial Hospitalcare.org    [facility] IOP:  Phone: [phone]  https://ealthfaTaunton State Hospital.org/specialties/Substance-Use   [facility] OP Program/Harm Reduction: T,TH 12:30pm-3:30pm (Accepts Medicare)   [facility] Co-occurring IOP: M,W, TH 9am-12pm (Accepts Medicare)   [facility]  Co-occurring IOP Morning:  M,T,W 9am-12:00pm   [place] Co-occurring IOP Evening: M,T,W 5:00pm-8:00pm (accepts Medicare)   [facility] OP: ?M,T,W 12:00pm-2:00pm (Accepts Medicare)    [place] Outpatient:  Locations: [facility], [place], [facility], and [facility].  Phone: [phone]  Fax:  [phone]  Email: [email or web address]  email: [email or web address]  [email or web address]://Produce Run/outpatient-treatment/    [facility] -  Co-Occurring IOP   [facility]  [address] [place] [address]  Phone: [phone]  Fax: [phone]  Email: [email or web address]  [email or web address]://[email or web address]/programs-services/intensive-outpatient/    [facility]:  Main phone: [phone]  Direct Dial: [phone]   Admissions email: [email or web address]   ADALID fax: [phone]  [email or web address]    [facility]:  Referral Fax: [phone]  [email or web address]  *[facility] Location : [address] [place] [address]  Phone: [phone] Fax: [phone]    [facility]: (self-pay only)  Phone: [phone]  Fax: [phone]  Email: info@[employer]  Address:  [address] [address]  https://www.[employer].Physiq/    Sober Support Groups:    [place] [facility] ([facility]): [facility] connects people seeking recovery to resources that help foster and sustain long-term recovery. Whether you are seeking resources for treatment, transportation, housing, job training, education, health care or other pathways to recovery, [facility] is a great place to start. Phone: [phone]. www.DriverTech.org (Great listing of all types of recovery and non-recovery related resources)      [facility]: https://www.XebiaLabs.org/ (Online meetings, support groups, resources)      Alcoholics Anonymous: 3-155-ALCOHOL  HTTP://WWW.AA.ORG/  AA [place]  (246.937.2843 or http://aamin[place].org)  AA [place] (821-521-0827 or www.aastpaul.org)      Narcotics Anonymous: 412.857.2393  www.naminn[place].us.     [facility]: https://www.refugerecovery.org/    [facility] Recovery: https://www.Wikimedia FoundationraterArmorText.com/what-we-offer/find-a-cr-meeting    [facility] through [facility]:  https://www.Rukuku/next-steps/find-support/addiction-recovery/    [facility]:  Offers a wide range of different sober support groups and a Sunday Service.  [address] [place] [address]    https://www.[facility].org/#gsc.tab=0    Amira Monzon MA [facility]  ADALID Evaluation Counselor  [phone]  [email or web address]

## 2024-11-23 LAB
ERYTHROCYTE [DISTWIDTH] IN BLOOD BY AUTOMATED COUNT: 13.8 % (ref 10–15)
HCT VFR BLD AUTO: 37.5 % (ref 40–53)
HGB BLD-MCNC: 12.2 G/DL (ref 13.3–17.7)
MAGNESIUM SERPL-MCNC: 2.2 MG/DL (ref 1.7–2.3)
MCH RBC QN AUTO: 33.7 PG (ref 26.5–33)
MCHC RBC AUTO-ENTMCNC: 32.5 G/DL (ref 31.5–36.5)
MCV RBC AUTO: 104 FL (ref 78–100)
PHOSPHATE SERPL-MCNC: 4.1 MG/DL (ref 2.5–4.5)
PLATELET # BLD AUTO: 146 10E3/UL (ref 150–450)
POTASSIUM SERPL-SCNC: 4.9 MMOL/L (ref 3.4–5.3)
RBC # BLD AUTO: 3.62 10E6/UL (ref 4.4–5.9)
WBC # BLD AUTO: 5.2 10E3/UL (ref 4–11)

## 2024-11-23 PROCEDURE — 250N000012 HC RX MED GY IP 250 OP 636 PS 637: Performed by: STUDENT IN AN ORGANIZED HEALTH CARE EDUCATION/TRAINING PROGRAM

## 2024-11-23 PROCEDURE — 250N000011 HC RX IP 250 OP 636: Performed by: INTERNAL MEDICINE

## 2024-11-23 PROCEDURE — 250N000013 HC RX MED GY IP 250 OP 250 PS 637: Performed by: STUDENT IN AN ORGANIZED HEALTH CARE EDUCATION/TRAINING PROGRAM

## 2024-11-23 PROCEDURE — 85014 HEMATOCRIT: CPT | Performed by: INTERNAL MEDICINE

## 2024-11-23 PROCEDURE — 250N000013 HC RX MED GY IP 250 OP 250 PS 637: Performed by: INTERNAL MEDICINE

## 2024-11-23 PROCEDURE — 84132 ASSAY OF SERUM POTASSIUM: CPT | Performed by: INTERNAL MEDICINE

## 2024-11-23 PROCEDURE — 120N000001 HC R&B MED SURG/OB

## 2024-11-23 PROCEDURE — 99233 SBSQ HOSP IP/OBS HIGH 50: CPT | Performed by: INTERNAL MEDICINE

## 2024-11-23 PROCEDURE — 83735 ASSAY OF MAGNESIUM: CPT | Performed by: INTERNAL MEDICINE

## 2024-11-23 PROCEDURE — 85041 AUTOMATED RBC COUNT: CPT | Performed by: INTERNAL MEDICINE

## 2024-11-23 PROCEDURE — 36415 COLL VENOUS BLD VENIPUNCTURE: CPT | Performed by: INTERNAL MEDICINE

## 2024-11-23 PROCEDURE — 250N000013 HC RX MED GY IP 250 OP 250 PS 637: Performed by: HOSPITALIST

## 2024-11-23 PROCEDURE — 84100 ASSAY OF PHOSPHORUS: CPT | Performed by: INTERNAL MEDICINE

## 2024-11-23 RX ORDER — HYDROXYZINE HYDROCHLORIDE 25 MG/1
25 TABLET, FILM COATED ORAL EVERY 6 HOURS PRN
Status: DISCONTINUED | OUTPATIENT
Start: 2024-11-23 | End: 2024-11-24 | Stop reason: HOSPADM

## 2024-11-23 RX ORDER — HYDROXYZINE HYDROCHLORIDE 25 MG/1
50 TABLET, FILM COATED ORAL EVERY 6 HOURS PRN
Status: DISCONTINUED | OUTPATIENT
Start: 2024-11-23 | End: 2024-11-24 | Stop reason: HOSPADM

## 2024-11-23 RX ORDER — NICOTINE 21 MG/24HR
1 PATCH, TRANSDERMAL 24 HOURS TRANSDERMAL DAILY
Status: DISCONTINUED | OUTPATIENT
Start: 2024-11-23 | End: 2024-11-24 | Stop reason: HOSPADM

## 2024-11-23 RX ADMIN — NICOTINE 1 PATCH: 21 PATCH, EXTENDED RELEASE TRANSDERMAL at 13:28

## 2024-11-23 RX ADMIN — BUPRENORPHINE AND NALOXONE 1 FILM: 8; 2 FILM, SOLUBLE BUCCAL; SUBLINGUAL at 16:15

## 2024-11-23 RX ADMIN — BUPRENORPHINE AND NALOXONE 1 FILM: 8; 2 FILM, SOLUBLE BUCCAL; SUBLINGUAL at 08:46

## 2024-11-23 RX ADMIN — DIAZEPAM 10 MG: 5 TABLET ORAL at 12:12

## 2024-11-23 RX ADMIN — DIAZEPAM 10 MG: 5 TABLET ORAL at 05:55

## 2024-11-23 RX ADMIN — HYDROXYZINE HYDROCHLORIDE 50 MG: 25 TABLET, FILM COATED ORAL at 18:19

## 2024-11-23 RX ADMIN — DIAZEPAM 10 MG: 5 TABLET ORAL at 16:33

## 2024-11-23 RX ADMIN — DIAZEPAM 10 MG: 5 TABLET ORAL at 00:08

## 2024-11-23 RX ADMIN — GABAPENTIN 900 MG: 300 CAPSULE ORAL at 10:09

## 2024-11-23 RX ADMIN — DIAZEPAM 10 MG: 5 TABLET ORAL at 08:52

## 2024-11-23 RX ADMIN — HYDROXYZINE HYDROCHLORIDE 25 MG: 25 TABLET, FILM COATED ORAL at 12:11

## 2024-11-23 RX ADMIN — FOLIC ACID 1 MG: 1 TABLET ORAL at 08:48

## 2024-11-23 RX ADMIN — GABAPENTIN 900 MG: 300 CAPSULE ORAL at 01:22

## 2024-11-23 RX ADMIN — GABAPENTIN 900 MG: 300 CAPSULE ORAL at 18:20

## 2024-11-23 RX ADMIN — Medication 250 MG: at 16:15

## 2024-11-23 RX ADMIN — DIAZEPAM 10 MG: 5 TABLET ORAL at 02:05

## 2024-11-23 ASSESSMENT — ACTIVITIES OF DAILY LIVING (ADL)
ADLS_ACUITY_SCORE: 0
DEPENDENT_IADLS:: INDEPENDENT
ADLS_ACUITY_SCORE: 0

## 2024-11-23 NOTE — PLAN OF CARE
Goal Outcome Evaluation:      Plan of Care Reviewed With: patient, parent          Outcome Evaluation: CD assessment done, likely discharge home to follow up OP for rehab

## 2024-11-23 NOTE — PLAN OF CARE
Date/Time: 11/23  Summary: ETOH w/Hallucinations  Cognitive Concerns/Orientation: A/O, BUE tremors  Behavior and Aggression Color: anxious  ABNL VS/O2:   CMS: Intact  Edema: -  Mobility: Independent  Pain Management: pt denies  Diet: R  Bowel/Bladder: WDL's  ABNL Labs/BG: WDL's  Drain/Devices: SL PIV  Telemetry Rhythm: -  Skin: WDL's  Tests/Procedures:   Discharge Date:   Other Info:   Prn Hydroxyzine, scheduled Nicotine added today

## 2024-11-23 NOTE — PROGRESS NOTES
[facility]    Hospitalist Progress Note    Date of Admission:  11/20/2024    Assessment & Plan     Adrian Yin is a 49 year old male with history of ankylosing spondylitis, OUD, AUD who was admitted on 11/20/2024 with acute alcohol withdrawal.     Acute alcohol withdrawal  Severe alcohol use disorder  Alcoholic hallucinosis  Hypophosphatemia, hyponatremia  Elevated LFTs  Patient presenting with paranoia, visual hallucinations, tongue fasciculation, upper extremity tremor consistent with acute alcohol withdrawal in setting of longstanding alcohol use.  No hemodynamic or autonomic instability noted, and no concern for acute delirium tremens at this time.  Hallucinations consistent with alcoholic hallucinosis.  Denies any history of alcohol withdrawal or withdrawal seizure.  Symptoms improved with IV Valium.  Electrolyte derangements notable and in setting of severe alcohol use with poor other p.o. intake.  Denies RUQ or epigastric pain and suspect LFT elevation in setting of alcohol use.  CIWA w/ PO and IV valium  High-dose thiamine, folate  thiamine and folate supplementation  Gabapentin taper ordered  K, mag replacement per protocol  ChemDep consult requested, I appreciate Amira Navarro's patient and recommendations.  Please see recommendations in her note.  She emailed them to the patient and placed recommendations in the AVS.  LFTs improving    Thrombocytopenia due to alcohol use  Stable in low 100s. No evidence of bleeding.    Hx of OUD  Continue PTA suboxone 8-2 milligram twice daily    Hx of ankylosing spondylitis - Due for PTA humira 11/21- hold.     Tobacco use disorder  Nicotine patch as needed     Diet: Regular diet  DVT Prophylaxis: Pneumatic Compression Devices and Ambulate every shift  Code Status: Full code      Medical Decision Making       Please see A&P for additional details of medical decision making.        Clinically Significant Risk Factors         #  "Hyponatremia: Lowest Na = 134 mmol/L in last 2 days, will monitor as appropriate  # Hypochloremia: Lowest Cl = 96 mmol/L in last 2 days, will monitor as appropriate        # Thrombocytopenia: Lowest platelets = 104 in last 2 days, will monitor for bleeding              # Obesity: Estimated body mass index is 31.32 kg/m  as calculated from the following:    Height as of this encounter: 1.702 m (5' 7\").    Weight as of this encounter: 90.7 kg (200 lb)., PRESENT ON ADMISSION            Donna Perez MD  Text Page (7am - 6pm, M-F)  [facility]  Securely message with the Vocera Web Console (learn more here)  Text page via [employer]1stBoxing.com Paging/Directory      Interval History   \"I would be willing to be discharged so I could go smoke.\"  Patient's main concern is he wants to go outside and smoke.  He understands we cannot treat him with medications, including benzodiazepines, per the CIWA protocol and then allow him to go outside independently and smoke.  He asked if someone could accompany him but we discussed that we do not have staff members that can be assigned to accompany patients outside for a smoking break.  His father, Felix, just arrived from [place].  He had multiple (appropriate) questions about the plan, answered to the best of my ability.  Patient says his last drink was last Sunday.      -Data reviewed today: I reviewed all new labs and imaging results over the last 24 hours. I personally reviewed EKG with result as noted above    Physical Exam   Temp: 98.6  F (37  C) Temp src: Oral BP: 114/71 Pulse: 81   Resp: 18 SpO2: 97 % O2 Device: None (Room air)    Vitals:    11/21/24 0600   Weight: 90.7 kg (200 lb)     Vital Signs with Ranges  Temp:  [97.9  F (36.6  C)-99.6  F (37.6  C)] 98.6  F (37  C)  Pulse:  [81-87] 81  Resp:  [16-18] 18  BP: (113-137)/(71-81) 114/71  SpO2:  [95 %-98 %] 97 %  I/O last 3 completed shifts:  In: 500 [P.O.:500]  Out: -     Constitutional: Awake, alert, " cooperative, no apparent distress  Respiratory: Clear to auscultation bilaterally, no crackles or wheezing  Cardiovascular: Regular rate and rhythm, normal S1 and S2, and no murmur noted  GI: Normal bowel sounds, soft, non-distended, non-tender  Skin/Integumen: No rash on exposed skin.  No lower extremity edema  Other: Mood is highly distracted     50 MINUTES SPENT BY ME on the date of service doing chart review, history, exam, documentation & further activities per the note, including discussion with bedside RN, Pharm.D., patient, patient's father      Medications   Current Facility-Administered Medications   Medication Dose Route Frequency Provider Last Rate Last Admin     Current Facility-Administered Medications   Medication Dose Route Frequency Provider Last Rate Last Admin    buprenorphine HCl-naloxone HCl (SUBOXONE) 8-2 MG per film 1 Film  1 Film Sublingual BID AC Clarence Garcias MD   1 Film at 11/22/24 1757    folic acid (FOLVITE) tablet 1 mg  1 mg Oral Daily Donna Perez MD   1 mg at 11/22/24 1008    [START ON 11/28/2024] gabapentin (NEURONTIN) capsule 100 mg  100 mg Oral Q8H Meena Aviles MD        [START ON 11/26/2024] gabapentin (NEURONTIN) capsule 300 mg  300 mg Oral Q8H Meena Aviles MD        [START ON 11/24/2024] gabapentin (NEURONTIN) capsule 600 mg  600 mg Oral Q8H Meena Aviles MD        gabapentin (NEURONTIN) capsule 900 mg  900 mg Oral Q8H Meena Aviles MD   900 mg at 11/23/24 0122    sodium chloride (PF) 0.9% PF flush 3 mL  3 mL Intracatheter Q8H Clarence Garcias MD   3 mL at 11/22/24 1758    thiamine (B-1) injection 250 mg  250 mg Intravenous Daily Donna Perez MD        Followed by    [START ON 11/28/2024] thiamine (B-1) tablet 100 mg  100 mg Oral Daily Donna Perez MD           Data   Recent Labs   Lab 11/22/24  0851 11/21/24 2057 11/21/24  0903 11/20/24 2054   WBC  --   --  6.6 10.4   HGB  --   --  11.7* 13.8   MCV  --   --   101* 96   PLT  --   --  104* 115*     --  134* 133*   POTASSIUM 4.0  --  4.0 3.6   CHLORIDE 99  --  96* 91*   CO2 27  --  26 25   BUN 8.2  --  12.8 9.3   CR 0.91  --  0.87 0.80   ANIONGAP 11  --  12 17*   YANDY 9.2  --  9.1 9.7   * 112* 93 90   ALBUMIN  --   --  3.9 4.6   PROTTOTAL  --   --  6.4 7.4   BILITOTAL  --   --  1.1 1.6*   ALKPHOS  --   --  194* 238*   ALT  --   --  70 87*   AST  --   --  72* 92*       Imaging  No results found for this or any previous visit (from the past 24 hours).

## 2024-11-23 NOTE — PLAN OF CARE
Goal Outcome Evaluation:                           11-23-24 4985-7248  Diagnosis:Acute alcohol withdrawal and Alcoholic hallucinosis  POD#:n/a  Mental Status: A&Ox4  CIWA: 8, 9, 8, 8 d/t anxiety & tremors; no longer hallucinating; PO valium w/ good effect  Activity/dangle: IND/steady  Diet: Regular, appetite good  Pain: denies pain  Gardner/Voiding: Cont  Tele/Restraints/Iso: t  02/LDA: VSS on RA, PIV SL  D/C Date: pending - chem dep consult today; see notes. SW also consulted  Other Info:K, Mag, phos protocols - recheck in the AM

## 2024-11-23 NOTE — CONSULTS
Care Management Initial Consult    General Information  Assessment completed with: Adrian Short  Type of CM/SW Visit: Initial Assessment    Primary Care Provider verified and updated as needed: Yes   Readmission within the last 30 days: no previous admission in last 30 days      Reason for Consult: discharge planning  Advance Care Planning: Advance Care Planning Reviewed: questions answered          Communication Assessment  Patient's communication style: spoken language (English or Bilingual)             Cognitive  Cognitive/Neuro/Behavioral: .WDL except  Level of Consciousness: somnolent  Arousal Level: opens eyes spontaneously  Orientation: oriented x 4  Mood/Behavior: anxious  Best Language: 0 - No aphasia  Speech: clear    Living Environment:   People in home: alone (Dog-Mari)     Current living Arrangements: apartment      Able to return to prior arrangements: yes       Family/Social Support:  Care provided by: self  Provides care for: pet(s)  Marital Status: Single  Support system: Parent(s)          Description of Support System: Supportive, Involved         Current Resources:   Patient receiving home care services: No        Community Resources:    Equipment currently used at home: none  Supplies currently used at home:      Employment/Financial:  Employment Status: employed full-time        Financial Concerns:             Does the patient's insurance plan have a 3 day qualifying hospital stay waiver?  No    Lifestyle & Psychosocial Needs:  Social Drivers of Health     Food Insecurity: No Food Insecurity (2/14/2024)    Received from [facility]    Hunger Vital Sign     Worried About Running Out of Food in the Last Year: Never true     Ran Out of Food in the Last Year: Never true   Depression: Not on file   Housing Stability: Unknown (2/14/2024)    Received from [facility]    Housing Stability Vital Sign     Unable to Pay for Housing in the Last Year: No     Number of Places Lived in the Last Year:  Not on file     Unstable Housing in the Last Year: No   Tobacco Use: Medium Risk (2/14/2024)    Received from [facility]    Patient History     Smoking Tobacco Use: Former     Smokeless Tobacco Use: Unknown     Passive Exposure: Not on file   Financial Resource Strain: Not on file   Alcohol Use: Not on file   Transportation Needs: No Transportation Needs (2/14/2024)    Received from [facility]    PRAPARE - Transportation     Lack of Transportation (Medical): No     Lack of Transportation (Non-Medical): No   Physical Activity: Not on file   Interpersonal Safety: Unknown (2/14/2024)    Received from [facility]    Humiliation, Afraid, Rape, and Kick questionnaire     Fear of Current or Ex-Partner: Not on file     Emotionally Abused: Not on file     Physically Abused: No     Sexually Abused: No   Stress: Not on file   Social Connections: Not on file   Health Literacy: Not on file       Functional Status:  Prior to admission patient needed assistance:   Dependent ADLs:: Independent  Dependent IADLs:: Independent       Mental Health Status:  Mental Health Status: No Current Concerns       Chemical Dependency Status:  Chemical Dependency Status: Current Concern  Chemical Dependency Management:  (resources given)          Values/Beliefs:  Spiritual, Cultural Beliefs, Judaism Practices, Values that affect care: no               Discussed  Partnership in Safe Discharge Planning  document with patient/family: Yes: Adrian    Additional Information:  Writer met with patient, introduced my role in discharge planning. Adrian verified his address (writer corrected facesheet), phone, insurance and PCP. He will schedule his own follow up. He lives in an apartment with his dog Mari, and is independent in all ADLs. He met with CD counsellor, and has been emailed resources, writer also verified these were included on his AVS. He indicated no needs from our team, and will likely go home no needs when medically stable.  No  further care management intervention anticipated at this time.  Please re-consult if further needs arise.  Care management signing off.        Next Steps: Medical clearance for discharge    María Elena Gamez RN Care Coordinator  [facility]  [phone]

## 2024-11-24 VITALS
RESPIRATION RATE: 16 BRPM | DIASTOLIC BLOOD PRESSURE: 94 MMHG | WEIGHT: 200 LBS | TEMPERATURE: 98.7 F | HEART RATE: 98 BPM | SYSTOLIC BLOOD PRESSURE: 140 MMHG | HEIGHT: 67 IN | OXYGEN SATURATION: 96 % | BODY MASS INDEX: 31.39 KG/M2

## 2024-11-24 PROCEDURE — 99239 HOSP IP/OBS DSCHRG MGMT >30: CPT | Performed by: INTERNAL MEDICINE

## 2024-11-24 PROCEDURE — 250N000013 HC RX MED GY IP 250 OP 250 PS 637: Performed by: STUDENT IN AN ORGANIZED HEALTH CARE EDUCATION/TRAINING PROGRAM

## 2024-11-24 PROCEDURE — 250N000013 HC RX MED GY IP 250 OP 250 PS 637: Performed by: INTERNAL MEDICINE

## 2024-11-24 PROCEDURE — 250N000013 HC RX MED GY IP 250 OP 250 PS 637: Performed by: HOSPITALIST

## 2024-11-24 PROCEDURE — 250N000012 HC RX MED GY IP 250 OP 636 PS 637: Performed by: STUDENT IN AN ORGANIZED HEALTH CARE EDUCATION/TRAINING PROGRAM

## 2024-11-24 RX ORDER — LANOLIN ALCOHOL/MO/W.PET/CERES
100 CREAM (GRAM) TOPICAL DAILY
Qty: 30 TABLET | Refills: 0 | Status: SHIPPED | OUTPATIENT
Start: 2024-11-27

## 2024-11-24 RX ORDER — HYDROXYZINE HYDROCHLORIDE 25 MG/1
25 TABLET, FILM COATED ORAL EVERY 6 HOURS PRN
Qty: 30 TABLET | Refills: 0 | Status: SHIPPED | OUTPATIENT
Start: 2024-11-24

## 2024-11-24 RX ORDER — GABAPENTIN 100 MG/1
CAPSULE ORAL
Qty: 63 CAPSULE | Refills: 0 | Status: SHIPPED | OUTPATIENT
Start: 2024-11-24 | End: 2024-12-01

## 2024-11-24 RX ADMIN — GABAPENTIN 900 MG: 300 CAPSULE ORAL at 09:45

## 2024-11-24 RX ADMIN — NICOTINE 1 PATCH: 21 PATCH, EXTENDED RELEASE TRANSDERMAL at 09:46

## 2024-11-24 RX ADMIN — FOLIC ACID 1 MG: 1 TABLET ORAL at 09:47

## 2024-11-24 RX ADMIN — DIAZEPAM 10 MG: 5 TABLET ORAL at 02:33

## 2024-11-24 RX ADMIN — Medication 250 MG: at 10:18

## 2024-11-24 RX ADMIN — BUPRENORPHINE AND NALOXONE 1 FILM: 8; 2 FILM, SOLUBLE BUCCAL; SUBLINGUAL at 09:45

## 2024-11-24 RX ADMIN — GABAPENTIN 900 MG: 300 CAPSULE ORAL at 02:04

## 2024-11-24 ASSESSMENT — ACTIVITIES OF DAILY LIVING (ADL)
ADLS_ACUITY_SCORE: 0

## 2024-11-24 NOTE — PLAN OF CARE
Goal Outcome Evaluation:      Plan of Care Reviewed With: patient    Overall Patient Progress: improvingOverall Patient Progress: improving        Date/Time:11/23/24 @ 7279-9951    Trauma/Ortho/Medical (Choose one) Medical  Diagnosis:Acute alcohol withdrawal  Severe alcohol use disorder  Alcoholic hallucinosis  Elevated LFTs  POD#:N/A  Mental Status:A and O x 4  Activity/dangle: Independent in the room  Diet:Regular  Pain:Managed with scheduled Gabapentin.  Gardner/Voiding:Bathroom  Tele/Restraints/Iso:N/A  02/LDA:On RA. PIV SL on the L lower forearm.  D/C Date:Pending discharge to home  Other Info: Elevated BP. On K, Mg and phosphorus protocol. Rechecks in place.  CIWA-Ar Score of 6,8,6.Diazepam 10 mg PO given 1x.

## 2024-11-24 NOTE — PLAN OF CARE
Goal Outcome Evaluation:       Pt has met his goals, discharge instructions given and medications given with teach back.  Pt discharged with his parents,

## 2024-11-24 NOTE — PLAN OF CARE
Diagnosis:ETOH withdrawal  Mental Status:AxOx4  Activity/dangle: Independent  Diet:Regular   Pain:Denies   Gardner/Voiding:Voiding S/L  D/C Date:TBD
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
## 2024-12-07 ENCOUNTER — HEALTH MAINTENANCE LETTER (OUTPATIENT)
Age: 49
End: 2024-12-07